# Patient Record
Sex: MALE | Race: WHITE | NOT HISPANIC OR LATINO | Employment: OTHER | URBAN - METROPOLITAN AREA
[De-identification: names, ages, dates, MRNs, and addresses within clinical notes are randomized per-mention and may not be internally consistent; named-entity substitution may affect disease eponyms.]

---

## 2017-12-19 ENCOUNTER — TELEPHONE (OUTPATIENT)
Dept: GASTROENTEROLOGY | Facility: CLINIC | Age: 67
End: 2017-12-19

## 2017-12-19 NOTE — TELEPHONE ENCOUNTER
----- Message from Dianne Bill sent at 12/19/2017  3:43 PM CST -----  Contact: self [phone]  States that he would like a new pt appt with Dr Jordan for cirrhosis. Please call back at [phone]//thank you acc

## 2018-01-05 ENCOUNTER — OFFICE VISIT (OUTPATIENT)
Dept: GASTROENTEROLOGY | Facility: CLINIC | Age: 68
End: 2018-01-05
Payer: MEDICARE

## 2018-01-05 ENCOUNTER — LAB VISIT (OUTPATIENT)
Dept: LAB | Facility: HOSPITAL | Age: 68
End: 2018-01-05
Attending: INTERNAL MEDICINE
Payer: MEDICARE

## 2018-01-05 VITALS
WEIGHT: 238.31 LBS | BODY MASS INDEX: 32.28 KG/M2 | SYSTOLIC BLOOD PRESSURE: 128 MMHG | HEART RATE: 60 BPM | HEIGHT: 72 IN | DIASTOLIC BLOOD PRESSURE: 80 MMHG

## 2018-01-05 DIAGNOSIS — K70.30 ALCOHOLIC CIRRHOSIS OF LIVER WITHOUT ASCITES: Primary | ICD-10-CM

## 2018-01-05 DIAGNOSIS — K70.30 ALCOHOLIC CIRRHOSIS OF LIVER WITHOUT ASCITES: ICD-10-CM

## 2018-01-05 DIAGNOSIS — E66.9 OBESITY (BMI 30.0-34.9): ICD-10-CM

## 2018-01-05 DIAGNOSIS — Z80.42 FAMILY HX OF PROSTATE CANCER: ICD-10-CM

## 2018-01-05 DIAGNOSIS — F10.10 ALCOHOL ABUSE: ICD-10-CM

## 2018-01-05 DIAGNOSIS — E78.2 MIXED HYPERLIPIDEMIA: ICD-10-CM

## 2018-01-05 DIAGNOSIS — I10 ESSENTIAL HYPERTENSION: ICD-10-CM

## 2018-01-05 PROBLEM — R73.03 PREDIABETES: Status: ACTIVE | Noted: 2017-09-07

## 2018-01-05 LAB
ALBUMIN SERPL BCP-MCNC: 3.7 G/DL
ALP SERPL-CCNC: 112 U/L
ALT SERPL W/O P-5'-P-CCNC: 33 U/L
ANION GAP SERPL CALC-SCNC: 9 MMOL/L
AST SERPL-CCNC: 25 U/L
BASOPHILS # BLD AUTO: 0.05 K/UL
BASOPHILS NFR BLD: 0.8 %
BILIRUB DIRECT SERPL-MCNC: 0.3 MG/DL
BILIRUB SERPL-MCNC: 0.8 MG/DL
BUN SERPL-MCNC: 15 MG/DL
CALCIUM SERPL-MCNC: 9.6 MG/DL
CHLORIDE SERPL-SCNC: 105 MMOL/L
CO2 SERPL-SCNC: 27 MMOL/L
CREAT SERPL-MCNC: 0.9 MG/DL
DIFFERENTIAL METHOD: ABNORMAL
EOSINOPHIL # BLD AUTO: 0.1 K/UL
EOSINOPHIL NFR BLD: 2.1 %
ERYTHROCYTE [DISTWIDTH] IN BLOOD BY AUTOMATED COUNT: 12.4 %
EST. GFR  (AFRICAN AMERICAN): >60 ML/MIN/1.73 M^2
EST. GFR  (NON AFRICAN AMERICAN): >60 ML/MIN/1.73 M^2
GLUCOSE SERPL-MCNC: 108 MG/DL
HCT VFR BLD AUTO: 42.9 %
HGB BLD-MCNC: 14.6 G/DL
IMM GRANULOCYTES # BLD AUTO: 0.02 K/UL
IMM GRANULOCYTES NFR BLD AUTO: 0.3 %
INR PPP: 1
LYMPHOCYTES # BLD AUTO: 1.9 K/UL
LYMPHOCYTES NFR BLD: 30.1 %
MCH RBC QN AUTO: 33.3 PG
MCHC RBC AUTO-ENTMCNC: 34 G/DL
MCV RBC AUTO: 98 FL
MONOCYTES # BLD AUTO: 0.6 K/UL
MONOCYTES NFR BLD: 9.1 %
NEUTROPHILS # BLD AUTO: 3.7 K/UL
NEUTROPHILS NFR BLD: 57.6 %
NRBC BLD-RTO: 0 /100 WBC
PLATELET # BLD AUTO: 178 K/UL
PMV BLD AUTO: 11.7 FL
POTASSIUM SERPL-SCNC: 4.3 MMOL/L
PROT SERPL-MCNC: 7.4 G/DL
PROTHROMBIN TIME: 10.8 SEC
RBC # BLD AUTO: 4.39 M/UL
SODIUM SERPL-SCNC: 141 MMOL/L
WBC # BLD AUTO: 6.34 K/UL

## 2018-01-05 PROCEDURE — 82247 BILIRUBIN TOTAL: CPT

## 2018-01-05 PROCEDURE — 36415 COLL VENOUS BLD VENIPUNCTURE: CPT

## 2018-01-05 PROCEDURE — 85610 PROTHROMBIN TIME: CPT

## 2018-01-05 PROCEDURE — 99204 OFFICE O/P NEW MOD 45 MIN: CPT | Mod: S$GLB,,, | Performed by: INTERNAL MEDICINE

## 2018-01-05 PROCEDURE — 99999 PR PBB SHADOW E&M-EST. PATIENT-LVL III: CPT | Mod: PBBFAC,,, | Performed by: INTERNAL MEDICINE

## 2018-01-05 PROCEDURE — 80048 BASIC METABOLIC PNL TOTAL CA: CPT

## 2018-01-05 PROCEDURE — 85025 COMPLETE CBC W/AUTO DIFF WBC: CPT

## 2018-01-05 PROCEDURE — 80076 HEPATIC FUNCTION PANEL: CPT

## 2018-01-05 RX ORDER — PHENYLEPHRINE HCL 10 MG
1000 TABLET ORAL
COMMUNITY
End: 2023-06-27

## 2018-01-05 RX ORDER — METFORMIN HYDROCHLORIDE 500 MG/1
500 TABLET, EXTENDED RELEASE ORAL
COMMUNITY
Start: 2017-12-14 | End: 2024-03-11

## 2018-01-05 RX ORDER — CARVEDILOL 6.25 MG/1
TABLET ORAL
COMMUNITY
Start: 2017-09-11

## 2018-01-05 RX ORDER — ATORVASTATIN CALCIUM 40 MG/1
20 TABLET, FILM COATED ORAL
COMMUNITY
Start: 2017-12-14

## 2018-01-05 RX ORDER — AMOXICILLIN 500 MG
2 CAPSULE ORAL
COMMUNITY

## 2018-01-05 RX ORDER — AMLODIPINE BESYLATE 5 MG/1
5 TABLET ORAL
COMMUNITY
Start: 2017-12-14

## 2018-01-05 RX ORDER — SILDENAFIL 100 MG/1
100 TABLET, FILM COATED ORAL
Status: ON HOLD | COMMUNITY
Start: 2017-01-13 | End: 2021-03-25 | Stop reason: HOSPADM

## 2018-01-05 RX ORDER — OMEPRAZOLE 40 MG/1
40 CAPSULE, DELAYED RELEASE ORAL
Status: ON HOLD | COMMUNITY
Start: 2017-12-14 | End: 2023-09-26 | Stop reason: SDUPTHER

## 2018-01-05 RX ORDER — IRBESARTAN 300 MG/1
TABLET ORAL
COMMUNITY
Start: 2017-11-29

## 2018-01-05 RX ORDER — ASPIRIN 81 MG/1
81 TABLET ORAL
COMMUNITY
End: 2023-06-27

## 2018-01-05 RX ORDER — MULTIVITAMIN
1 TABLET ORAL
COMMUNITY

## 2018-01-05 RX ORDER — DOCUSATE SODIUM 100 MG/1
100 CAPSULE, LIQUID FILLED ORAL
COMMUNITY

## 2018-01-05 NOTE — PROGRESS NOTES
Subjective:       Patient ID: Terence Marin is a 67 y.o. male.    Chief Complaint: Cirrhosis    The patient has a history of abnormal LFTs and has been suggested of having Cirrhosis due to Alcohol. He is here for a 2nd opinion. We have sent for records from the patient's previous MD and they are pending. I do have the results of an abdominal U/S done in April of 2016 which showed his liver to be unremarkable and he had no splenomegaly and no Ascites. He has, however, been drinking heavily in the recent pass until he stopped in November.     He as never been jaundiced. He has never been diagnosed with hepatitis, but he has been transfused before after his splenic rupture in ~ 2004. He has never used IV drugs and never had a tatoo. He was told recently that he has evidence of alcoholic cirrhosis, but has not had imaging studies done. I assume this could be from fibrosure evaluation. He was also told he did not have evidence of hepatitis. He has never been told of having Fatty Liver, but he recently has been shown to have what sounds like a slightly elevated elevated Hgb A1C. Discussed the impact this could have in a patient wtth HTN and hyperlipidemia and the added risk of Steato-hepatitis. Some of these things had to be considered from his medication list and some limited history from the patient and his wife.      Review of Systems   Constitutional: Negative.  Negative for activity change, appetite change, chills, diaphoresis, fatigue, fever and unexpected weight change.   HENT: Negative for congestion, ear discharge, facial swelling, hearing loss, nosebleeds, postnasal drip, sinus pressure, sneezing, tinnitus, trouble swallowing and voice change.    Eyes: Negative for photophobia, redness and visual disturbance.   Respiratory: Negative for cough, chest tightness, shortness of breath and wheezing.    Cardiovascular: Negative for chest pain and palpitations.   Gastrointestinal: Positive for abdominal distention.  Negative for abdominal pain, blood in stool, constipation, diarrhea, nausea, rectal pain and vomiting.   Genitourinary: Negative for difficulty urinating, discharge, dysuria, flank pain, frequency, hematuria, scrotal swelling, testicular pain and urgency.   Musculoskeletal: Positive for arthralgias. Negative for back pain, gait problem, joint swelling, myalgias and neck stiffness.        Joint Stiffness   Skin: Negative for color change, pallor, rash and wound.   Neurological: Negative for dizziness, tremors, seizures, syncope, facial asymmetry, speech difficulty, weakness, light-headedness, numbness and headaches.   Hematological: Negative for adenopathy. Does not bruise/bleed easily.   Psychiatric/Behavioral: Negative for agitation, confusion, hallucinations, sleep disturbance and suicidal ideas.       Objective:      Physical Exam   Constitutional: He is oriented to person, place, and time. He appears well-developed and well-nourished. No distress.   HENT:   Head: Normocephalic and atraumatic.   Nose: Nose normal.   Mouth/Throat: Oropharynx is clear and moist. No oropharyngeal exudate.   Eyes: Conjunctivae are normal. Pupils are equal, round, and reactive to light. No scleral icterus.   Neck: Normal range of motion. Neck supple. No thyromegaly present.   Cardiovascular: Normal rate and regular rhythm.  Exam reveals no gallop and no friction rub.    No murmur heard.  Pulmonary/Chest: Effort normal and breath sounds normal. No respiratory distress. He has no wheezes. He has no rales.   Abdominal: Soft. Bowel sounds are normal. He exhibits no distension and no mass. There is no tenderness. There is no rebound and no guarding.   Musculoskeletal: He exhibits no edema or tenderness.   Lymphadenopathy:     He has no cervical adenopathy.   Neurological: He is alert and oriented to person, place, and time. He exhibits normal muscle tone. Coordination normal.   Skin: Skin is warm. No rash noted. He is not diaphoretic.    Psychiatric: He has a normal mood and affect. His behavior is normal. Judgment and thought content normal.       Assessment:   Abnormal liver function studies  Alcohol abuse  Suspicion of cirrhosis        Records from referring physician in further studies needed  Obesity   ? Early type 2 diabetes   HTN  Hyperlipidemia  Hx of Colon Polyps  Hx of Prostate Cancer  No diagnosis found.    Plan:   As outlined above.  Further recommendations will follow

## 2018-01-08 ENCOUNTER — TELEPHONE (OUTPATIENT)
Dept: GASTROENTEROLOGY | Facility: CLINIC | Age: 68
End: 2018-01-08

## 2018-01-08 NOTE — TELEPHONE ENCOUNTER
----- Message from Lanie Adams sent at 1/8/2018  8:36 AM CST -----  Contact: [facility]/Dr. Ivone [facility]  [facility]issa called and stated they received a request for records; they need a signed release from the patient to be faxed to them.     Fax number [phone].    She can be contacted at [phone].    Thanks,  Lanie

## 2018-01-10 ENCOUNTER — TELEPHONE (OUTPATIENT)
Dept: RADIOLOGY | Facility: HOSPITAL | Age: 68
End: 2018-01-10

## 2018-01-11 ENCOUNTER — HOSPITAL ENCOUNTER (OUTPATIENT)
Dept: RADIOLOGY | Facility: HOSPITAL | Age: 68
Discharge: HOME | End: 2018-01-11
Attending: INTERNAL MEDICINE
Payer: MEDICARE

## 2018-01-11 DIAGNOSIS — K70.30 ALCOHOLIC CIRRHOSIS OF LIVER WITHOUT ASCITES: ICD-10-CM

## 2018-01-11 LAB
A2 MACROGLOB SERPL-MCNC: 284 MG/DL (ref 106–279)
ALT SERPL W P-5'-P-CCNC: 29 U/L (ref 9–46)
APO A-I SERPL-MCNC: 139 MG/DL (ref 94–176)
BILIRUB SERPL-MCNC: 0.6 MG/DL (ref 0.2–1.2)
FIBROSIS STAGE SERPL QL: ABNORMAL
FIBROTEST INTERPRETATION: ABNORMAL
FOOTNOTE: ABNORMAL
GGT SERPL-CCNC: 38 U/L (ref 3–70)
HAPTOGLOB SERPL-MCNC: 112 MG/DL (ref 43–212)
LIVER FIBR SCORE SERPL CALC.FIBROSURE: 0.59
NECROINFLAMMAT INTERP: ABNORMAL
NECROINFLAMMATORY ACT GRADE SERPL QL: ABNORMAL
NECROINFLAMMATORY ACT SCORE SERPL: 0.2
REFERENCE ID: ABNORMAL

## 2018-01-11 PROCEDURE — 76700 US EXAM ABDOM COMPLETE: CPT | Mod: 26,,, | Performed by: RADIOLOGY

## 2018-01-11 PROCEDURE — 76700 US EXAM ABDOM COMPLETE: CPT | Mod: TC

## 2018-01-15 ENCOUNTER — OFFICE VISIT (OUTPATIENT)
Dept: GASTROENTEROLOGY | Facility: CLINIC | Age: 68
End: 2018-01-15
Payer: MEDICARE

## 2018-01-15 VITALS
SYSTOLIC BLOOD PRESSURE: 130 MMHG | WEIGHT: 238.75 LBS | HEIGHT: 72 IN | HEART RATE: 74 BPM | BODY MASS INDEX: 32.34 KG/M2 | DIASTOLIC BLOOD PRESSURE: 82 MMHG

## 2018-01-15 DIAGNOSIS — K76.0 FATTY LIVER: ICD-10-CM

## 2018-01-15 DIAGNOSIS — K80.20 ASYMPTOMATIC CHOLELITHIASIS: ICD-10-CM

## 2018-01-15 DIAGNOSIS — E66.9 OBESITY (BMI 30-39.9): ICD-10-CM

## 2018-01-15 DIAGNOSIS — K70.9 ALCOHOLIC LIVER DISEASE: Primary | ICD-10-CM

## 2018-01-15 PROCEDURE — 99999 PR PBB SHADOW E&M-EST. PATIENT-LVL III: CPT | Mod: PBBFAC,,, | Performed by: INTERNAL MEDICINE

## 2018-01-15 PROCEDURE — 99213 OFFICE O/P EST LOW 20 MIN: CPT | Mod: S$GLB,,, | Performed by: INTERNAL MEDICINE

## 2018-01-29 NOTE — PROGRESS NOTES
Subjective:       Patient ID: Terence Marin is a 67 y.o. male.    Chief Complaint: Follow-up    The patient was last seen on January 7 regarding a second opinion on a diagnosis given of alcoholic cirrhosis.  The details of that visit are well outlined in the record from that date and reviewed in detail with the patient's visit today.  He is here today to review the results of ordered studies done since his last visit.    Abdominal ultrasound reveals the presence of borderline hepatomegaly with increased echogenicity suggestive of fatty infiltration of the liver.  Though this is true, the echogenicity could be suggestive of the effects of chronic liver disease associated with his alcohol history is also still could be suggestive of the presence of fat.  There was, however, no splenomegaly, no nodularity, no varices, and no splenomegaly mentioned.  The patient was noted to have cholelithiasis with sludge but no evidence of acute cholecystitis and there was no tenderness with passage of the echoprobe over the examined gallbladder area.  The patient's labs show no evidence BMP, LFTs, and PT/INR to be within normal range.  His CBC was also unremarkable.    The patient's fiber sure study supported the observation of the ultrasound with findings suggestive of stage III fibrosis but not cirrhosis.  In fact the patient's liver function test showed improvement in his transaminases levels from his November study until now, consistent with his claim of abstinence since that time.  His AST was 49 in November, and is now down to 25.        Review of Systems   Constitutional: Negative.  Negative for activity change, appetite change, chills, diaphoresis, fatigue, fever and unexpected weight change.   HENT: Negative for congestion, ear discharge, facial swelling, hearing loss, nosebleeds, postnasal drip, sinus pressure, sneezing, tinnitus, trouble swallowing and voice change.    Eyes: Negative for photophobia, redness and visual  disturbance.   Respiratory: Negative for cough, chest tightness, shortness of breath and wheezing.    Cardiovascular: Negative for chest pain and palpitations.   Gastrointestinal: Negative for abdominal distention, abdominal pain, blood in stool, constipation, diarrhea, nausea, rectal pain and vomiting.   Genitourinary: Negative for difficulty urinating, discharge, dysuria, flank pain, frequency, hematuria, scrotal swelling, testicular pain and urgency.   Musculoskeletal: Negative for arthralgias, back pain, gait problem, joint swelling, myalgias and neck stiffness.   Skin: Negative for color change, pallor, rash and wound.   Neurological: Negative for dizziness, tremors, seizures, syncope, facial asymmetry, speech difficulty, weakness, light-headedness, numbness and headaches.   Hematological: Negative for adenopathy. Does not bruise/bleed easily.   Psychiatric/Behavioral: Negative for agitation, confusion, hallucinations, sleep disturbance and suicidal ideas.       Objective:      Physical Exam    Assessment:     Alcoholic liver disease               No evidence of presentation suggest cirrhosis              He does understand that stage III fibrosis is suggestive of significant liver damage   Asymptomatic cholelithiasis   No diagnosis found.    Plan:   It was again emphasized that ongoing alcohol consumption presents a significant risk of alcoholic liver disease.  Also reminded him that he has risk factors for fatty liver disease which could lead to steatohepatitis.  Weight reduction again was discussed in its major impact on fatty liver not related to alcohol, none with lipids and possible insulin resistance and weight issues in the picture THINGS have to be weighed on the polyps.  The patient insists that abstinence is in his future and he will be working on weight reduction issues as well.There is no need to address cholelithiasis as the patient is asymptomatic.

## 2018-06-04 ENCOUNTER — TELEPHONE (OUTPATIENT)
Dept: GASTROENTEROLOGY | Facility: CLINIC | Age: 68
End: 2018-06-04

## 2018-06-15 ENCOUNTER — LAB VISIT (OUTPATIENT)
Dept: LAB | Facility: HOSPITAL | Age: 68
End: 2018-06-15
Attending: NURSE PRACTITIONER
Payer: MEDICARE

## 2018-06-15 ENCOUNTER — OFFICE VISIT (OUTPATIENT)
Dept: GASTROENTEROLOGY | Facility: CLINIC | Age: 68
End: 2018-06-15
Payer: MEDICARE

## 2018-06-15 VITALS
SYSTOLIC BLOOD PRESSURE: 116 MMHG | HEIGHT: 72 IN | WEIGHT: 228.19 LBS | DIASTOLIC BLOOD PRESSURE: 80 MMHG | BODY MASS INDEX: 30.91 KG/M2 | HEART RATE: 84 BPM

## 2018-06-15 DIAGNOSIS — K76.0 FATTY LIVER: ICD-10-CM

## 2018-06-15 DIAGNOSIS — K76.9 CHRONIC LIVER DISEASE: Primary | ICD-10-CM

## 2018-06-15 DIAGNOSIS — K76.9 CHRONIC LIVER DISEASE: ICD-10-CM

## 2018-06-15 LAB
ALBUMIN SERPL BCP-MCNC: 4 G/DL
ALP SERPL-CCNC: 103 U/L
ALT SERPL W/O P-5'-P-CCNC: 24 U/L
ANION GAP SERPL CALC-SCNC: 6 MMOL/L
AST SERPL-CCNC: 22 U/L
BASOPHILS # BLD AUTO: 0.03 K/UL
BASOPHILS NFR BLD: 0.5 %
BILIRUB SERPL-MCNC: 0.8 MG/DL
BUN SERPL-MCNC: 19 MG/DL
CALCIUM SERPL-MCNC: 10.1 MG/DL
CHLORIDE SERPL-SCNC: 106 MMOL/L
CO2 SERPL-SCNC: 28 MMOL/L
CREAT SERPL-MCNC: 1.3 MG/DL
DIFFERENTIAL METHOD: ABNORMAL
EOSINOPHIL # BLD AUTO: 0.2 K/UL
EOSINOPHIL NFR BLD: 2.5 %
ERYTHROCYTE [DISTWIDTH] IN BLOOD BY AUTOMATED COUNT: 13.2 %
EST. GFR  (AFRICAN AMERICAN): >60 ML/MIN/1.73 M^2
EST. GFR  (NON AFRICAN AMERICAN): 56.1 ML/MIN/1.73 M^2
GLUCOSE SERPL-MCNC: 88 MG/DL
HCT VFR BLD AUTO: 41.5 %
HGB BLD-MCNC: 14 G/DL
IMM GRANULOCYTES # BLD AUTO: 0.02 K/UL
IMM GRANULOCYTES NFR BLD AUTO: 0.3 %
INR PPP: 1
LYMPHOCYTES # BLD AUTO: 1.7 K/UL
LYMPHOCYTES NFR BLD: 28.6 %
MCH RBC QN AUTO: 32.9 PG
MCHC RBC AUTO-ENTMCNC: 33.7 G/DL
MCV RBC AUTO: 98 FL
MONOCYTES # BLD AUTO: 0.6 K/UL
MONOCYTES NFR BLD: 9.2 %
NEUTROPHILS # BLD AUTO: 3.5 K/UL
NEUTROPHILS NFR BLD: 58.9 %
NRBC BLD-RTO: 0 /100 WBC
PLATELET # BLD AUTO: 204 K/UL
PMV BLD AUTO: 11.8 FL
POTASSIUM SERPL-SCNC: 5 MMOL/L
PROT SERPL-MCNC: 7.4 G/DL
PROTHROMBIN TIME: 10.6 SEC
RBC # BLD AUTO: 4.25 M/UL
SODIUM SERPL-SCNC: 140 MMOL/L
WBC # BLD AUTO: 5.98 K/UL

## 2018-06-15 PROCEDURE — 36415 COLL VENOUS BLD VENIPUNCTURE: CPT

## 2018-06-15 PROCEDURE — 85610 PROTHROMBIN TIME: CPT

## 2018-06-15 PROCEDURE — 99999 PR PBB SHADOW E&M-EST. PATIENT-LVL III: CPT | Mod: PBBFAC,,, | Performed by: NURSE PRACTITIONER

## 2018-06-15 PROCEDURE — 80053 COMPREHEN METABOLIC PANEL: CPT

## 2018-06-15 PROCEDURE — 85025 COMPLETE CBC W/AUTO DIFF WBC: CPT

## 2018-06-15 PROCEDURE — 3079F DIAST BP 80-89 MM HG: CPT | Mod: CPTII,S$GLB,, | Performed by: NURSE PRACTITIONER

## 2018-06-15 PROCEDURE — 3074F SYST BP LT 130 MM HG: CPT | Mod: CPTII,S$GLB,, | Performed by: NURSE PRACTITIONER

## 2018-06-15 PROCEDURE — 99214 OFFICE O/P EST MOD 30 MIN: CPT | Mod: S$GLB,,, | Performed by: NURSE PRACTITIONER

## 2018-06-15 RX ORDER — SILDENAFIL CITRATE 20 MG/1
TABLET ORAL
Status: ON HOLD | COMMUNITY
Start: 2018-02-13 | End: 2021-03-25 | Stop reason: HOSPADM

## 2018-06-15 NOTE — PROGRESS NOTES
Clinic Follow Up:  [facility] Follow Up Note    Reason for Follow Up:  The primary encounter diagnosis was Chronic liver disease. A diagnosis of Fatty liver was also pertinent to this visit.    PCP: To Obtain Unable       HPI:  This is a 67 y.o. male here for follow up of the above. He has seen Dr. Solomon previously but is new to me. He has chronic liver disease. This is likely secondary to his past alcohol abuse and MÉNDEZ. He has not had any alcohol since November 2017. He admits to drinking non-alcoholic beer. He is working with his primary care on good control over cholesterol and DM. He has started to exercise and has been intentionally losing weight. He denies any GI complaints at this time. No abdominal pain, hematochezia, melena, nausea, vomiting, or weight loss.  No evidence of liver decompensation: no ascites, confusion, or GI bleeding.    Review of Systems   Constitutional: Negative for activity change and appetite change.        As per interval history above   Respiratory: Negative for cough and shortness of breath.    Cardiovascular: Negative for chest pain.   Gastrointestinal: Negative for abdominal distention, abdominal pain, anal bleeding, blood in stool, constipation, diarrhea, nausea, rectal pain and vomiting.   Skin: Negative for color change and rash.       Medical History:  Past Medical History:   Diagnosis Date    Cancer     prostate    Diabetes mellitus     Hyperlipidemia     Hypertension     Ruptured spleen        Surgical History:   Past Surgical History:   Procedure Laterality Date    PROSTATECTOMY      TONSILLECTOMY         Family History:   Family History   Problem Relation Age of Onset    Lymphoma Mother     Dementia Father     Prostate cancer Father     Heart disease Father     Prostate cancer Brother        Social History:   Social History   Substance Use Topics    Smoking status: Former Smoker     Packs/day: 1.50     Years: 10.00     Types: Cigarettes      Quit date: 1/5/1979    Smokeless tobacco: Never Used    Alcohol use 33.6 oz/week     42 Cans of beer, 14 Glasses of wine per week      Comment: quit november 2nd       Allergies: Review of patient's allergies indicates:  No Known Allergies    Home Medications:  Current Outpatient Prescriptions on File Prior to Visit   Medication Sig Dispense Refill    amLODIPine (NORVASC) 5 MG tablet Take 5 mg by mouth.      aspirin (ECOTRIN) 81 MG EC tablet Take 81 mg by mouth.      atorvastatin (LIPITOR) 40 MG tablet Take 20 mg by mouth.      carvedilol (COREG) 6.25 MG tablet       cinnamon bark 500 mg capsule Take 1,000 mg by mouth.      docusate sodium (COLACE) 100 MG capsule Take 100 mg by mouth.      fish oil-omega-3 fatty acids 300-1,000 mg capsule Take 2 g by mouth.      irbesartan (AVAPRO) 300 MG tablet TAKE 1 TABLET EVERY DAY. NEED APPOINTMENT.      metFORMIN (GLUCOPHAGE-XR) 500 MG 24 hr tablet Take 500 mg by mouth.      multivitamin (THERAGRAN) per tablet Take 1 tablet by mouth.      omeprazole (PRILOSEC) 40 MG capsule Take 40 mg by mouth.      sildenafil (VIAGRA) 100 MG tablet Take 100 mg by mouth.       No current facility-administered medications on file prior to visit.        /80   Pulse 84   Ht 6' (1.829 m)   Wt 103.5 kg (228 lb 2.8 oz)   BMI 30.95 kg/m²   Body mass index is 30.95 kg/m².  Physical Exam   Constitutional: He is oriented to person, place, and time and well-developed, well-nourished, and in no distress. No distress.   HENT:   Head: Normocephalic.   Eyes: Conjunctivae are normal. Pupils are equal, round, and reactive to light.   Cardiovascular: Normal rate, regular rhythm and normal heart sounds.    Pulmonary/Chest: Effort normal and breath sounds normal. No respiratory distress.   Abdominal: Soft. Bowel sounds are normal. He exhibits no distension. There is no tenderness.   Neurological: He is alert and oriented to person, place, and time. No cranial nerve deficit.   Skin:  Skin is warm and dry. No rash noted.   Psychiatric: Mood and affect normal.       Labs: Pertinent labs reviewed.  Endoscopy:    CRC Screening:     Assessment:  1. Chronic liver disease    2. Fatty liver        Recommendations:  - patient stable and doing well  - he has chronic liver disease but no evidence of cirrhosis. Would like to monitor him every 6 months  - he has been completely abstaining  from alcohol and is losing weight.  - encouraged to continue   - recheck labs and ultrasound  - follow up in 6 months.   -     US Abdomen Complete; Future; Expected date: 06/15/2018  -     CBC auto differential; Future; Expected date: 06/15/2018  -     Comprehensive metabolic panel; Future; Expected date: 06/15/2018  -     Protime-INR; Future; Expected date: 06/15/2018    Return to Clinic:  Follow-up in about 6 months (around 12/15/2018).    Thank you for the opportunity to participate in the care of this patient.  RORY Smith

## 2018-06-28 ENCOUNTER — TELEPHONE (OUTPATIENT)
Dept: RADIOLOGY | Facility: HOSPITAL | Age: 68
End: 2018-06-28

## 2018-06-29 ENCOUNTER — HOSPITAL ENCOUNTER (OUTPATIENT)
Dept: RADIOLOGY | Facility: HOSPITAL | Age: 68
Discharge: HOME | End: 2018-06-29
Attending: NURSE PRACTITIONER
Payer: MEDICARE

## 2018-06-29 DIAGNOSIS — K76.0 FATTY LIVER: ICD-10-CM

## 2018-06-29 DIAGNOSIS — K76.9 CHRONIC LIVER DISEASE: ICD-10-CM

## 2018-06-29 PROCEDURE — 76700 US EXAM ABDOM COMPLETE: CPT | Mod: 26,,, | Performed by: RADIOLOGY

## 2018-06-29 PROCEDURE — 76700 US EXAM ABDOM COMPLETE: CPT | Mod: TC

## 2018-07-06 ENCOUNTER — TELEPHONE (OUTPATIENT)
Dept: GASTROENTEROLOGY | Facility: CLINIC | Age: 68
End: 2018-07-06

## 2018-07-06 NOTE — TELEPHONE ENCOUNTER
----- Message from Rupesh Grier sent at 7/6/2018  1:21 PM CDT -----  Contact: pt   Pt calling to get lab results. [facility] return call.       ..[phone] (home)

## 2019-01-25 ENCOUNTER — TELEPHONE (OUTPATIENT)
Dept: GASTROENTEROLOGY | Facility: CLINIC | Age: 69
End: 2019-01-25

## 2019-01-30 ENCOUNTER — OFFICE VISIT (OUTPATIENT)
Dept: GASTROENTEROLOGY | Facility: CLINIC | Age: 69
End: 2019-01-30
Payer: MEDICARE

## 2019-01-30 VITALS
HEART RATE: 80 BPM | DIASTOLIC BLOOD PRESSURE: 76 MMHG | SYSTOLIC BLOOD PRESSURE: 130 MMHG | BODY MASS INDEX: 31.36 KG/M2 | WEIGHT: 231.25 LBS

## 2019-01-30 DIAGNOSIS — K74.00 LIVER FIBROSIS: ICD-10-CM

## 2019-01-30 DIAGNOSIS — K76.0 FATTY LIVER: Primary | ICD-10-CM

## 2019-01-30 PROCEDURE — 1101F PT FALLS ASSESS-DOCD LE1/YR: CPT | Mod: CPTII,S$GLB,, | Performed by: NURSE PRACTITIONER

## 2019-01-30 PROCEDURE — 3075F PR MOST RECENT SYSTOLIC BLOOD PRESS GE 130-139MM HG: ICD-10-PCS | Mod: CPTII,S$GLB,, | Performed by: NURSE PRACTITIONER

## 2019-01-30 PROCEDURE — 99999 PR PBB SHADOW E&M-EST. PATIENT-LVL III: ICD-10-PCS | Mod: PBBFAC,,, | Performed by: NURSE PRACTITIONER

## 2019-01-30 PROCEDURE — 3078F DIAST BP <80 MM HG: CPT | Mod: CPTII,S$GLB,, | Performed by: NURSE PRACTITIONER

## 2019-01-30 PROCEDURE — 3078F PR MOST RECENT DIASTOLIC BLOOD PRESSURE < 80 MM HG: ICD-10-PCS | Mod: CPTII,S$GLB,, | Performed by: NURSE PRACTITIONER

## 2019-01-30 PROCEDURE — 99214 PR OFFICE/OUTPT VISIT, EST, LEVL IV, 30-39 MIN: ICD-10-PCS | Mod: S$GLB,,, | Performed by: NURSE PRACTITIONER

## 2019-01-30 PROCEDURE — 99999 PR PBB SHADOW E&M-EST. PATIENT-LVL III: CPT | Mod: PBBFAC,,, | Performed by: NURSE PRACTITIONER

## 2019-01-30 PROCEDURE — 1101F PR PT FALLS ASSESS DOC 0-1 FALLS W/OUT INJ PAST YR: ICD-10-PCS | Mod: CPTII,S$GLB,, | Performed by: NURSE PRACTITIONER

## 2019-01-30 PROCEDURE — 99214 OFFICE O/P EST MOD 30 MIN: CPT | Mod: S$GLB,,, | Performed by: NURSE PRACTITIONER

## 2019-01-30 PROCEDURE — 3075F SYST BP GE 130 - 139MM HG: CPT | Mod: CPTII,S$GLB,, | Performed by: NURSE PRACTITIONER

## 2019-01-30 NOTE — PROGRESS NOTES
Clinic Follow Up:  [facility] Follow Up Note    Reason for Follow Up:  The primary encounter diagnosis was Fatty liver. A diagnosis of Liver fibrosis was also pertinent to this visit.    PCP: Endy Long       HPI:  This is a 68 y.o. male here for follow up of the above  Pt previously seen by Vania Shipman NP.  Today is my first visit with him.  He was previously diagnosed with liver fibrosis and fatty liver per GI at [facility].  At that time, his LFTs were elevated.  He reports heavy ETOH at that time.  He states that he has had no ETOH in over 1 year.    He has been monitoring his diet and has some weight loss.   He had recent labs done with PCP and per Care Everywhere, shows LFTs are WNL.   Denies any abdominal pain.  No nausea or vomiting.  No change in bowel pattern.  No melena or hematochezia. No weight loss.  No upper GI bleeding.  No ascites or BLE.  No overt confusion.      Review of Systems   Constitutional: Negative for chills, fever, malaise/fatigue and weight loss.   Respiratory: Negative for cough.    Cardiovascular: Negative for chest pain.   Gastrointestinal:        Per HPI   Musculoskeletal: Negative for myalgias.   Skin: Negative for itching and rash.   Neurological: Negative for headaches.   Psychiatric/Behavioral: The patient is not nervous/anxious.        Medical History:  Past Medical History:   Diagnosis Date    Cancer     prostate    Diabetes mellitus     Hyperlipidemia     Hypertension     Ruptured spleen        Surgical History:   Past Surgical History:   Procedure Laterality Date    PROSTATECTOMY      TONSILLECTOMY         Family History:   Family History   Problem Relation Age of Onset    Lymphoma Mother     Dementia Father     Prostate cancer Father     Heart disease Father     Prostate cancer Brother        Social History:   Social History     Tobacco Use    Smoking status: Former Smoker     Packs/day: 1.50     Years: 10.00     Pack years:  15.00     Types: Cigarettes     Last attempt to quit: 1979     Years since quittin.0    Smokeless tobacco: Never Used   Substance Use Topics    Alcohol use: Yes     Alcohol/week: 33.6 oz     Types: 42 Cans of beer, 14 Glasses of wine per week     Comment: quit     Drug use: No       Allergies: Reviewed    Home Medications:  Current Outpatient Medications on File Prior to Visit   Medication Sig Dispense Refill    amLODIPine (NORVASC) 5 MG tablet Take 5 mg by mouth.      aspirin (ECOTRIN) 81 MG EC tablet Take 81 mg by mouth.      atorvastatin (LIPITOR) 40 MG tablet Take 20 mg by mouth.      carvedilol (COREG) 6.25 MG tablet       cinnamon bark 500 mg capsule Take 1,000 mg by mouth.      docusate sodium (COLACE) 100 MG capsule Take 100 mg by mouth.      fish oil-omega-3 fatty acids 300-1,000 mg capsule Take 2 g by mouth.      irbesartan (AVAPRO) 300 MG tablet TAKE 1 TABLET EVERY DAY. NEED APPOINTMENT.      metFORMIN (GLUCOPHAGE-XR) 500 MG 24 hr tablet Take 500 mg by mouth.      multivitamin (THERAGRAN) per tablet Take 1 tablet by mouth.      omeprazole (PRILOSEC) 40 MG capsule Take 40 mg by mouth.      sildenafil (REVATIO) 20 mg Tab Take 5 po 45 minutes before intercourse.      sildenafil (VIAGRA) 100 MG tablet Take 100 mg by mouth.       No current facility-administered medications on file prior to visit.        Physical Exam:  Vital Signs:  /76   Pulse 80   Wt 104.9 kg (231 lb 4.2 oz)   BMI 31.36 kg/m²   Body mass index is 31.36 kg/m².  Physical Exam   Constitutional: He is oriented to person, place, and time. He appears well-developed.   HENT:   Head: Normocephalic.   Eyes: No scleral icterus.   Neck: Normal range of motion.   Cardiovascular: Normal rate and regular rhythm.   Pulmonary/Chest: Effort normal and breath sounds normal.   Abdominal: Soft. Bowel sounds are normal. He exhibits no distension. There is no tenderness.   Musculoskeletal: Normal range of motion.    Neurological: He is alert and oriented to person, place, and time.   Skin: Skin is warm and dry.   Psychiatric: He has a normal mood and affect.   Vitals reviewed.      Labs: Pertinent labs reviewed.    Assessment:  1. Fatty liver    2. Liver fibrosis        Recommendations:  Labs are stable  - Will plan for US and Fibroscan for staging.   - If advanced fibrosis or cirrhosis is found, will need additional management including EGD for EV screening and AFP.     Fatty liver  -     US Abdomen Complete; Future; Expected date: 01/30/2019  -     US Elastography Liver; Future; Expected date: 01/30/2019    Liver fibrosis  -     US Abdomen Complete; Future; Expected date: 01/30/2019  -     US Elastography Liver; Future; Expected date: 01/30/2019          Return to Clinic:  6 months depending on results of above

## 2019-02-05 ENCOUNTER — TELEPHONE (OUTPATIENT)
Dept: RADIOLOGY | Facility: HOSPITAL | Age: 69
End: 2019-02-05

## 2019-02-06 ENCOUNTER — HOSPITAL ENCOUNTER (OUTPATIENT)
Dept: RADIOLOGY | Facility: HOSPITAL | Age: 69
Discharge: HOME | End: 2019-02-06
Attending: NURSE PRACTITIONER
Payer: MEDICARE

## 2019-02-06 ENCOUNTER — PROCEDURE VISIT (OUTPATIENT)
Dept: GASTROENTEROLOGY | Facility: CLINIC | Age: 69
End: 2019-02-06
Attending: NURSE PRACTITIONER
Payer: MEDICARE

## 2019-02-06 VITALS
HEIGHT: 72 IN | BODY MASS INDEX: 30.99 KG/M2 | DIASTOLIC BLOOD PRESSURE: 76 MMHG | SYSTOLIC BLOOD PRESSURE: 132 MMHG | HEART RATE: 60 BPM | WEIGHT: 228.81 LBS

## 2019-02-06 DIAGNOSIS — K76.0 FATTY LIVER: ICD-10-CM

## 2019-02-06 DIAGNOSIS — K74.00 LIVER FIBROSIS: ICD-10-CM

## 2019-02-06 PROCEDURE — 76700 US ABDOMEN COMPLETE: ICD-10-PCS | Mod: 26,,, | Performed by: RADIOLOGY

## 2019-02-06 PROCEDURE — 91200 PR LIVER ELASTOGRAPHY W/OUT IMAG W/INTERP & REPORT: ICD-10-PCS | Mod: S$GLB,,, | Performed by: NURSE PRACTITIONER

## 2019-02-06 PROCEDURE — 91200 LIVER ELASTOGRAPHY: CPT | Mod: S$GLB,,, | Performed by: NURSE PRACTITIONER

## 2019-02-06 PROCEDURE — 76700 US EXAM ABDOM COMPLETE: CPT | Mod: 26,,, | Performed by: RADIOLOGY

## 2019-02-06 PROCEDURE — 76700 US EXAM ABDOM COMPLETE: CPT | Mod: TC

## 2019-02-08 NOTE — PROCEDURES
Fibroscan Procedure     Name: Terence Marin  Date of Procedure : 02/08/2019   :: ERNESTO Holloway  Diagnosis: NAFLD      Fibrosis:F4       Steatosis: :S3       Interpretation: scan shows cirrhosis of the liver with significant steatosis.  Needs F/U every 6 months for MELD labs and HCC surveillance.

## 2019-02-13 ENCOUNTER — TELEPHONE (OUTPATIENT)
Dept: GASTROENTEROLOGY | Facility: CLINIC | Age: 69
End: 2019-02-13

## 2019-02-13 NOTE — TELEPHONE ENCOUNTER
Called and spoke with patient about results of fibroscan and US. Pt verbalized understanding. Appt scheduled. LIZETH.

## 2019-02-15 ENCOUNTER — OFFICE VISIT (OUTPATIENT)
Dept: GASTROENTEROLOGY | Facility: CLINIC | Age: 69
End: 2019-02-15
Payer: MEDICARE

## 2019-02-15 VITALS
BODY MASS INDEX: 31.26 KG/M2 | DIASTOLIC BLOOD PRESSURE: 80 MMHG | HEIGHT: 72 IN | SYSTOLIC BLOOD PRESSURE: 150 MMHG | HEART RATE: 80 BPM | WEIGHT: 230.81 LBS

## 2019-02-15 DIAGNOSIS — K70.30 ALCOHOLIC CIRRHOSIS OF LIVER WITHOUT ASCITES: Primary | ICD-10-CM

## 2019-02-15 DIAGNOSIS — K76.0 FATTY LIVER: ICD-10-CM

## 2019-02-15 PROCEDURE — 3079F DIAST BP 80-89 MM HG: CPT | Mod: CPTII,S$GLB,, | Performed by: NURSE PRACTITIONER

## 2019-02-15 PROCEDURE — 3079F PR MOST RECENT DIASTOLIC BLOOD PRESSURE 80-89 MM HG: ICD-10-PCS | Mod: CPTII,S$GLB,, | Performed by: NURSE PRACTITIONER

## 2019-02-15 PROCEDURE — 1101F PT FALLS ASSESS-DOCD LE1/YR: CPT | Mod: CPTII,S$GLB,, | Performed by: NURSE PRACTITIONER

## 2019-02-15 PROCEDURE — 99214 OFFICE O/P EST MOD 30 MIN: CPT | Mod: S$GLB,,, | Performed by: NURSE PRACTITIONER

## 2019-02-15 PROCEDURE — 3077F PR MOST RECENT SYSTOLIC BLOOD PRESSURE >= 140 MM HG: ICD-10-PCS | Mod: CPTII,S$GLB,, | Performed by: NURSE PRACTITIONER

## 2019-02-15 PROCEDURE — 1101F PR PT FALLS ASSESS DOC 0-1 FALLS W/OUT INJ PAST YR: ICD-10-PCS | Mod: CPTII,S$GLB,, | Performed by: NURSE PRACTITIONER

## 2019-02-15 PROCEDURE — 99214 PR OFFICE/OUTPT VISIT, EST, LEVL IV, 30-39 MIN: ICD-10-PCS | Mod: S$GLB,,, | Performed by: NURSE PRACTITIONER

## 2019-02-15 PROCEDURE — 99999 PR PBB SHADOW E&M-EST. PATIENT-LVL IV: ICD-10-PCS | Mod: PBBFAC,,, | Performed by: NURSE PRACTITIONER

## 2019-02-15 PROCEDURE — 99999 PR PBB SHADOW E&M-EST. PATIENT-LVL IV: CPT | Mod: PBBFAC,,, | Performed by: NURSE PRACTITIONER

## 2019-02-15 PROCEDURE — 3077F SYST BP >= 140 MM HG: CPT | Mod: CPTII,S$GLB,, | Performed by: NURSE PRACTITIONER

## 2019-02-15 NOTE — PROGRESS NOTES
Clinic Follow Up:  [facility] Follow Up Note    Reason for Follow Up:  The primary encounter diagnosis was Alcoholic cirrhosis of liver without ascites. A diagnosis of Fatty liver was also pertinent to this visit.    PCP: Endy Long       HPI:  This is a 68 y.o. male here for follow up of the above  Pt states that he is feeling well without any complaints  Recent fibroscan showed cirrhosis with a significant amount of steatosis.    Denies any abdominal pain.  No nausea or vomiting.  No change in bowel pattern.  No melena or hematochezia. No weight loss.  No upper GI bleeding.  No ascites or BLE.  No overt confusion.      Review of Systems   Constitutional: Negative for chills, fever, malaise/fatigue and weight loss.   Respiratory: Negative for cough.    Cardiovascular: Negative for chest pain.   Gastrointestinal:        Per HPI   Musculoskeletal: Negative for myalgias.   Skin: Negative for itching and rash.   Neurological: Negative for headaches.   Psychiatric/Behavioral: The patient is not nervous/anxious.        Medical History:  Past Medical History:   Diagnosis Date    Cancer     prostate    Diabetes mellitus     Hyperlipidemia     Hypertension     Ruptured spleen        Surgical History:   Past Surgical History:   Procedure Laterality Date    PROSTATECTOMY      TONSILLECTOMY         Family History:   Family History   Problem Relation Age of Onset    Lymphoma Mother     Dementia Father     Prostate cancer Father     Heart disease Father     Prostate cancer Brother        Social History:   Social History     Tobacco Use    Smoking status: Former Smoker     Packs/day: 1.50     Years: 10.00     Pack years: 15.00     Types: Cigarettes     Last attempt to quit: 1979     Years since quittin.1    Smokeless tobacco: Never Used   Substance Use Topics    Alcohol use: Yes     Alcohol/week: 33.6 oz     Types: 42 Cans of beer, 14 Glasses of wine per week     Comment: quit  november 2nd    Drug use: No       Allergies: Reviewed    Home Medications:  Current Outpatient Medications on File Prior to Visit   Medication Sig Dispense Refill    amLODIPine (NORVASC) 5 MG tablet Take 5 mg by mouth.      aspirin (ECOTRIN) 81 MG EC tablet Take 81 mg by mouth.      atorvastatin (LIPITOR) 40 MG tablet Take 20 mg by mouth.      carvedilol (COREG) 6.25 MG tablet       cinnamon bark 500 mg capsule Take 1,000 mg by mouth.      docusate sodium (COLACE) 100 MG capsule Take 100 mg by mouth.      fish oil-omega-3 fatty acids 300-1,000 mg capsule Take 2 g by mouth.      irbesartan (AVAPRO) 300 MG tablet TAKE 1 TABLET EVERY DAY. NEED APPOINTMENT.      metFORMIN (GLUCOPHAGE-XR) 500 MG 24 hr tablet Take 500 mg by mouth.      multivitamin (THERAGRAN) per tablet Take 1 tablet by mouth.      omeprazole (PRILOSEC) 40 MG capsule Take 40 mg by mouth.      sildenafil (REVATIO) 20 mg Tab Take 5 po 45 minutes before intercourse.      sildenafil (VIAGRA) 100 MG tablet Take 100 mg by mouth.       No current facility-administered medications on file prior to visit.        Physical Exam:  Vital Signs:  BP (!) 150/80   Pulse 80   Ht 6' (1.829 m)   Wt 104.7 kg (230 lb 13.2 oz)   BMI 31.31 kg/m²   Body mass index is 31.31 kg/m².  Physical Exam   Constitutional: He is oriented to person, place, and time. He appears well-developed.   HENT:   Head: Normocephalic.   Neck: Normal range of motion.   Cardiovascular: Normal rate and regular rhythm.   Pulmonary/Chest: Effort normal and breath sounds normal.   Abdominal: Soft. Bowel sounds are normal. He exhibits no distension. There is no tenderness.   Musculoskeletal: Normal range of motion.   Neurological: He is alert and oriented to person, place, and time.   Skin: Skin is warm and dry.   Psychiatric: He has a normal mood and affect.   Vitals reviewed.      Labs: Pertinent labs reviewed.  MELD-Na score: 9 at 6/15/2018  9:43 AM  MELD score: 9 at 6/15/2018  9:43  AM  Calculated from:  Serum Creatinine: 1.3 mg/dL at 6/15/2018  9:43 AM  Serum Sodium: 140 mmol/L (Rounded to 137 mmol/L) at 6/15/2018  9:43 AM  Total Bilirubin: 0.8 mg/dL (Rounded to 1 mg/dL) at 6/15/2018  9:43 AM  INR(ratio): 1 at 6/15/2018  9:43 AM  Age: 67 years\      Assessment:  1. Alcoholic cirrhosis of liver without ascites    2. Fatty liver        Recommendations:  - discussed diagnosis with potential complications and prognosis  - Pt states understanding  - Continue ETOH abstinence  - Encouraged weight loss through diet and exercise  - Needs EGD for EV screening  - Will plan for MELD labs and HCC surveillance every 6 months  Most recent labs on 1/22/19 with PCP per Care Everywhere  Alcoholic cirrhosis of liver without ascites  -     Case request GI: EGD (ESOPHAGOGASTRODUODENOSCOPY)  -     CBC auto differential; Future; Expected date: 02/15/2019  -     Comprehensive metabolic panel; Future; Expected date: 02/15/2019  -     Protime-INR; Future; Expected date: 02/15/2019  -     AFP tumor marker; Future; Expected date: 02/15/2019  -     US Abdomen Complete; Future; Expected date: 02/15/2019    Fatty liver  -     Case request GI: EGD (ESOPHAGOGASTRODUODENOSCOPY)  -     CBC auto differential; Future; Expected date: 02/15/2019  -     Comprehensive metabolic panel; Future; Expected date: 02/15/2019  -     Protime-INR; Future; Expected date: 02/15/2019  -     AFP tumor marker; Future; Expected date: 02/15/2019  -     US Abdomen Complete; Future; Expected date: 02/15/2019          Return to Clinic:    6 months with pre-clinic labs and imaging.

## 2019-02-25 NOTE — TELEPHONE ENCOUNTER
Handled in result notes. Patient verbalized understanding,   Sore Throat in Children: Care Instructions  Your Care Instructions  Infection by bacteria or a virus causes most sore throats. Cigarette smoke, dry air, air pollution, allergies, or yelling also can cause a sore throat. Sore throats can be painful and annoying. Fortunately, most sore throats go away on their own. Home treatment may help your child feel better sooner. Antibiotics are not needed unless your child has a strep infection. Follow-up care is a key part of your child's treatment and safety. Be sure to make and go to all appointments, and call your doctor if your child is having problems. It's also a good idea to know your child's test results and keep a list of the medicines your child takes. How can you care for your child at home? · If the doctor prescribed antibiotics for your child, give them as directed. Do not stop using them just because your child feels better. Your child needs to take the full course of antibiotics. · If your child is old enough to do so, have him or her gargle with warm salt water at least once each hour to help reduce swelling and relieve discomfort. Use 1 teaspoon of salt mixed in 8 ounces of warm water. Most children can gargle when they are 10to 6years old. · Give acetaminophen (Tylenol) or ibuprofen (Advil, Motrin) for pain. Read and follow all instructions on the label. Do not give aspirin to anyone younger than 20. It has been linked to Reye syndrome, a serious illness. · Try an over-the-counter anesthetic throat spray or throat lozenges, which may help relieve throat pain. Do not give lozenges to children younger than age 3. If your child is younger than age 3, ask your doctor if you can give your child numbing medicines. · Have your child drink plenty of fluids, enough so that his or her urine is light yellow or clear like water. Drinks such as warm water or warm lemonade may ease throat pain.  Frozen ice treats, ice cream, scrambled eggs, gelatin dessert, and sherbet can also soothe the throat. If your child has kidney, heart, or liver disease and has to limit fluids, talk with your doctor before you increase the amount of fluids your child drinks. · Keep your child away from smoke. Do not smoke or let anyone else smoke around your child or in your house. Smoke irritates the throat. · Place a humidifier by your child's bed or close to your child. This may make it easier for your child to breathe. Follow the directions for cleaning the machine. When should you call for help? Call 911 anytime you think your child may need emergency care. For example, call if:    · Your child is confused, does not know where he or she is, or is extremely sleepy or hard to wake up.    Call your doctor now or seek immediate medical care if:    · Your child has a new or higher fever.     · Your child has a fever with a stiff neck or a severe headache.     · Your child has any trouble breathing.     · Your child cannot swallow or cannot drink enough because of throat pain.     · Your child coughs up discolored or bloody mucus.    Watch closely for changes in your child's health, and be sure to contact your doctor if:    · Your child has any new symptoms, such as a rash, an earache, vomiting, or nausea.     · Your child is not getting better as expected. Where can you learn more? Go to http://colby-fritz.info/. Enter E175 in the search box to learn more about \"Sore Throat in Children: Care Instructions. \"  Current as of: March 27, 2018  Content Version: 11.9  © 6176-8957 Healthwise, Incorporated. Care instructions adapted under license by [employer] (which disclaims liability or warranty for this information). If you have questions about a medical condition or this instruction, always ask your healthcare professional. [place] [address] any warranty or liability for your use of this information.          Thrush in Children: Care Instructions  Your Care Instructions  Irene Bald is a yeast infection inside the mouth. It can look like milk, formula, or cottage cheese but is hard to remove. If you scrape the thrush away, the skin underneath may bleed. Your child might get thrush after using antibiotics. Often there is not a specific cause. It sometimes occurs at the same time as a diaper rash. Irene Bald in infants and young children isn't a serious problem. It usually goes away on its own. Some children may need antifungal medicine. Follow-up care is a key part of your child's treatment and safety. Be sure to make and go to all appointments, and call your doctor if your child is having problems. It's also a good idea to know your child's test results and keep a list of the medicines your child takes. How can you care for your child at home? · Clean bottle nipples and pacifiers regularly in boiling water. · If you are breastfeeding, use an antifungal medicine, such as nystatin (Mycostatin), on your nipples. Dry your nipples after breastfeeding. · If your child is eating solid foods, you can massage plain, unflavored yogurt around the inside of your child's mouth. Check the label to make sure that the yogurt contains live cultures. Yogurt may help healthy bacteria grow in the mouth. These bacteria can stop yeast growth. · Be safe with medicines. Have your child take medicines exactly as prescribed. Call your doctor if you think your child is having a problem with his or her medicine. When should you call for help? Watch closely for changes in your child's health, and be sure to contact your doctor if:    · Your child will not eat or drink.     · You have trouble giving or applying the medicine to your child.     · Your child still has thrush after 7 days.     · Your child gets a new diaper rash.     · Your child is not acting normally.     · Your child has a fever. Where can you learn more?   Go to http://colby-fritz.info/. Enter V150 in the search box to learn more about \"Thrush in Children: Care Instructions. \"  Current as of: March 27, 2018  Content Version: 11.9  © 6918-5530 [employer]orporated. Care instructions adapted under license by [employer] (which disclaims liability or warranty for this information). If you have questions about a medical condition or this instruction, always ask your healthcare professional. [address] any warranty or liability for your use of this information.

## 2019-03-18 ENCOUNTER — TELEPHONE (OUTPATIENT)
Dept: GASTROENTEROLOGY | Facility: CLINIC | Age: 69
End: 2019-03-18

## 2019-05-20 ENCOUNTER — ANESTHESIA (OUTPATIENT)
Dept: ENDOSCOPY | Facility: HOSPITAL | Age: 69
End: 2019-05-20
Payer: MEDICARE

## 2019-05-20 ENCOUNTER — HOSPITAL ENCOUNTER (OUTPATIENT)
Facility: HOSPITAL | Age: 69
Discharge: HOME | End: 2019-05-20
Attending: INTERNAL MEDICINE | Admitting: INTERNAL MEDICINE
Payer: MEDICARE

## 2019-05-20 ENCOUNTER — ANESTHESIA EVENT (OUTPATIENT)
Dept: ENDOSCOPY | Facility: HOSPITAL | Age: 69
End: 2019-05-20
Payer: MEDICARE

## 2019-05-20 DIAGNOSIS — I85.00 VARICES, ESOPHAGEAL: ICD-10-CM

## 2019-05-20 DIAGNOSIS — K70.30 ALCOHOLIC CIRRHOSIS OF LIVER WITHOUT ASCITES: Primary | ICD-10-CM

## 2019-05-20 PROCEDURE — 37000008 HC ANESTHESIA 1ST 15 MINUTES: Performed by: INTERNAL MEDICINE

## 2019-05-20 PROCEDURE — 88305 TISSUE EXAM BY PATHOLOGIST: CPT | Mod: 26,,, | Performed by: PATHOLOGY

## 2019-05-20 PROCEDURE — 37000009 HC ANESTHESIA EA ADD 15 MINS: Performed by: INTERNAL MEDICINE

## 2019-05-20 PROCEDURE — 43239 EGD BIOPSY SINGLE/MULTIPLE: CPT | Mod: ,,, | Performed by: INTERNAL MEDICINE

## 2019-05-20 PROCEDURE — 43239 EGD BIOPSY SINGLE/MULTIPLE: CPT | Performed by: INTERNAL MEDICINE

## 2019-05-20 PROCEDURE — 88305 TISSUE SPECIMEN TO PATHOLOGY: ICD-10-PCS | Mod: 26,,, | Performed by: PATHOLOGY

## 2019-05-20 PROCEDURE — 27201012 HC FORCEPS, HOT/COLD, DISP: Performed by: INTERNAL MEDICINE

## 2019-05-20 PROCEDURE — 25000003 PHARM REV CODE 250: Performed by: INTERNAL MEDICINE

## 2019-05-20 PROCEDURE — 63600175 PHARM REV CODE 636 W HCPCS: Performed by: NURSE ANESTHETIST, CERTIFIED REGISTERED

## 2019-05-20 PROCEDURE — 88305 TISSUE EXAM BY PATHOLOGIST: CPT | Performed by: PATHOLOGY

## 2019-05-20 PROCEDURE — 43239 PR EGD, FLEX, W/BIOPSY, SGL/MULTI: ICD-10-PCS | Mod: ,,, | Performed by: INTERNAL MEDICINE

## 2019-05-20 RX ORDER — SODIUM CHLORIDE, SODIUM LACTATE, POTASSIUM CHLORIDE, CALCIUM CHLORIDE 600; 310; 30; 20 MG/100ML; MG/100ML; MG/100ML; MG/100ML
INJECTION, SOLUTION INTRAVENOUS CONTINUOUS
Status: DISCONTINUED | OUTPATIENT
Start: 2019-05-20 | End: 2019-05-20 | Stop reason: HOSPADM

## 2019-05-20 RX ORDER — LIDOCAINE HCL/PF 100 MG/5ML
SYRINGE (ML) INTRAVENOUS
Status: DISCONTINUED | OUTPATIENT
Start: 2019-05-20 | End: 2019-05-20

## 2019-05-20 RX ORDER — PROPOFOL 10 MG/ML
INJECTION, EMULSION INTRAVENOUS
Status: DISCONTINUED | OUTPATIENT
Start: 2019-05-20 | End: 2019-05-20

## 2019-05-20 RX ORDER — SODIUM CHLORIDE 0.9 % (FLUSH) 0.9 %
10 SYRINGE (ML) INJECTION
Status: DISCONTINUED | OUTPATIENT
Start: 2019-05-20 | End: 2019-05-20 | Stop reason: HOSPADM

## 2019-05-20 RX ADMIN — PROPOFOL 140 MG: 10 INJECTION, EMULSION INTRAVENOUS at 07:05

## 2019-05-20 RX ADMIN — PROPOFOL 20 MG: 10 INJECTION, EMULSION INTRAVENOUS at 07:05

## 2019-05-20 RX ADMIN — SODIUM CHLORIDE, SODIUM LACTATE, POTASSIUM CHLORIDE, AND CALCIUM CHLORIDE: 600; 310; 30; 20 INJECTION, SOLUTION INTRAVENOUS at 07:05

## 2019-05-20 RX ADMIN — PROPOFOL 40 MG: 10 INJECTION, EMULSION INTRAVENOUS at 07:05

## 2019-05-20 RX ADMIN — LIDOCAINE HYDROCHLORIDE 100 MG: 20 INJECTION, SOLUTION INTRAVENOUS at 07:05

## 2019-05-20 NOTE — DISCHARGE INSTRUCTIONS
Dear Terence Marin      If you develop fevers, severe abdominal pain, significant bleeding, nausea or vomiting, please contact us or come to our Emergency Department at [facility].  Our  contact number is [phone] available for emergencies or any question that you may have.      You may return to normal activities tomorrow.  Written discharge instructions were provided to you today and have them handy since you may not remember our conversation today.       If you take Aspirin, please resume taking it at your prior dose today. If we obtained biopsies or removed polyps during your procedure, we will contact you within 5 to 6 days with the results.      Thanks for trusting us with your healthcare needs.      Sincerely,     Faheem Wilkins M.D.        To rate your experience with Dr. Wilkins, please click on the link below     [email or web address]

## 2019-05-20 NOTE — ANESTHESIA RELEASE NOTE
Anesthesia Release from PACU Note    Patient: Terence Marin    Procedure(s) Performed: Procedure(s) (LRB):  EGD (ESOPHAGOGASTRODUODENOSCOPY) (N/A)    Anesthesia type: MAC    Post pain: Adequate analgesia    Post assessment: no apparent anesthetic complications, tolerated procedure well and no evidence of recall    Last Vitals:   Visit Vitals  BP (!) 92/47   Pulse (!) 49   Temp 36.6 °C (97.9 °F)   Resp (!) 21   Ht 6' (1.829 m)   Wt 103.7 kg (228 lb 9.9 oz)   SpO2 95%   BMI 31.01 kg/m²       Post vital signs: stable    Level of consciousness: awake and alert     Nausea/Vomiting: no nausea/no vomiting    Complications: none    Airway Patency: patent    Respiratory: unassisted, spontaneous ventilation, room air    Cardiovascular: stable    Hydration: euvolemic

## 2019-05-20 NOTE — ANESTHESIA POSTPROCEDURE EVALUATION
Anesthesia Post Evaluation    Patient: Terence Marin    Procedure(s) Performed: Procedure(s) (LRB):  EGD (ESOPHAGOGASTRODUODENOSCOPY) (N/A)    Final Anesthesia Type: MAC  Patient location during evaluation: PACU  Patient participation: Yes- Able to Participate  Level of consciousness: awake and alert and oriented  Post-procedure vital signs: reviewed and stable  Pain management: adequate  Airway patency: patent  PONV status at discharge: No PONV  Anesthetic complications: no      Cardiovascular status: blood pressure returned to baseline  Respiratory status: unassisted, room air and spontaneous ventilation  Hydration status: euvolemic  Follow-up not needed.          Vitals Value Taken Time   BP 92/47 5/20/2019  8:08 AM   Temp 36.6 °C (97.9 °F) 5/20/2019  8:08 AM   Pulse 49 5/20/2019  8:08 AM   Resp 21 5/20/2019  8:08 AM   SpO2 95 % 5/20/2019  8:08 AM         No case tracking events are documented in the log.      Pain/Arnie Score: No data recorded

## 2019-05-20 NOTE — DISCHARGE SUMMARY
Endoscopy Discharge Summary      Admit Date: 5/20/2019    Discharge Date and Time:  5/20/2019 8:09 AM    Attending Physician: Faheem Wilkins MD     Discharge Physician: Faheem Wilkins MD     Principal Admitting Diagnoses: Alcoholic cirrhosis of liver without ascites         Discharge Diagnosis: The primary encounter diagnosis was Alcoholic cirrhosis of liver without ascites. A diagnosis of Varices, esophageal was also pertinent to this visit.     Discharged Condition: Good    Indication for Admission: Alcoholic cirrhosis of liver without ascites     Hospital Course: Patient was admitted for an inpatient procedure and tolerated the procedure well with no complications.    Significant Diagnostic Studies: EGD    Pathology (if any):  Specimen (12h ago, onward)    Start     Ordered    05/20/19 0758  Specimen to Pathology - Surgery  Once     Comments:  1.  Antrum biopsy -R/O H pylori,Mild gastritis     Start Status     05/20/19 0758 Needs to be Collected Order ID: [id number]       05/20/19 0801          Disposition: Home.    Bleeding: None    No Implants         Follow Up/Patient Instructions:   Current Discharge Medication List      CONTINUE these medications which have NOT CHANGED    Details   amLODIPine (NORVASC) 5 MG tablet Take 5 mg by mouth.      aspirin (ECOTRIN) 81 MG EC tablet Take 81 mg by mouth.      atorvastatin (LIPITOR) 40 MG tablet Take 20 mg by mouth.      carvedilol (COREG) 6.25 MG tablet       cinnamon bark 500 mg capsule Take 1,000 mg by mouth.      docusate sodium (COLACE) 100 MG capsule Take 100 mg by mouth.      fish oil-omega-3 fatty acids 300-1,000 mg capsule Take 2 g by mouth.      irbesartan (AVAPRO) 300 MG tablet TAKE 1 TABLET EVERY DAY. NEED APPOINTMENT.      multivitamin (THERAGRAN) per tablet Take 1 tablet by mouth.      omeprazole (PRILOSEC) 40 MG capsule Take 40 mg by mouth.      sildenafil (REVATIO) 20 mg Tab Take 5 po 45 minutes before intercourse.      sildenafil (VIAGRA) 100 MG tablet  Take 100 mg by mouth.      metFORMIN (GLUCOPHAGE-XR) 500 MG 24 hr tablet Take 500 mg by mouth.             Discharge Procedure Orders   Diet general     Call MD for:  temperature >100.4     Call MD for:  persistent nausea and vomiting     Call MD for:  severe uncontrolled pain     Call MD for:  difficulty breathing, headache or visual disturbances     Call MD for:  redness, tenderness, or signs of infection (pain, swelling, redness, odor or green/yellow discharge around incision site)     Call MD for:  hives     Call MD for:  persistent dizziness or light-headedness     No dressing needed       Follow-up Information     Endy Long MD.    Specialty:  Family Medicine  Why:  As needed  Contact information:  [facility] [facility]  [address]  [facility] [id number] [phone]

## 2019-05-20 NOTE — ANESTHESIA PREPROCEDURE EVALUATION
05/20/2019  Terence Marin is a 68 y.o., male.    Anesthesia Evaluation    I have reviewed the Patient Summary Reports.    I have reviewed the Nursing Notes.   I have reviewed the Medications.     Review of Systems  Anesthesia Hx:  No problems with previous Anesthesia    Social:  Former Smoker, No Alcohol Use    Hematology/Oncology:  Hematology Normal       -- Cancer in past history:  surgery  Oncology Comments: History of prostate cancer over 11 years ago.     EENT/Dental:   chronic allergic rhinitis   Cardiovascular:   Exercise tolerance: good Hypertension, well controlled hyperlipidemia    Hepatic/GI:   Liver Disease, Varices, esophageal   Musculoskeletal:  Musculoskeletal Normal    Neurological:  Neurology Normal    Endocrine:   Diabetes, well controlled, type 2        Physical Exam  General:  Well nourished, Obesity    Airway/Jaw/Neck:  Airway Findings: Mallampati: IV                Anesthesia Plan  Type of Anesthesia, risks & benefits discussed:  Anesthesia Type:  MAC  Patient's Preference:   Intra-op Monitoring Plan:   Intra-op Monitoring Plan Comments:   Post Op Pain Control Plan:   Post Op Pain Control Plan Comments:   Induction:   IV  Beta Blocker:  Patient is on a Beta-Blocker and has received one dose within the past 24 hours (No further documentation required).       Informed Consent: Patient understands risks and agrees with Anesthesia plan.  Questions answered. Anesthesia consent signed with patient.  ASA Score: 3     Day of Surgery Review of History & Physical: I have interviewed and examined the patient. I have reviewed the patient's H&P dated: 05/20/19. There are no significant changes.  H&P update referred to the surgeon.         Ready For Surgery From Anesthesia Perspective.

## 2019-05-20 NOTE — TRANSFER OF CARE
Anesthesia Transfer of Care Note    Patient: Terence Marin    Procedure(s) Performed: Procedure(s) (LRB):  EGD (ESOPHAGOGASTRODUODENOSCOPY) (N/A)    Patient location: PACU    Anesthesia Type: MAC    Transport from OR: Transported from OR on room air with adequate spontaneous ventilation    Post pain: adequate analgesia    Post assessment: no apparent anesthetic complications    Post vital signs: stable    Level of consciousness: sedated    Nausea/Vomiting: no nausea/vomiting    Complications: none    Transfer of care protocol was followed      Last vitals:   Visit Vitals  BP (!) 92/47   Pulse (!) 49   Temp 36.6 °C (97.9 °F)   Resp (!) 21   Ht 6' (1.829 m)   Wt 103.7 kg (228 lb 9.9 oz)   SpO2 95%   BMI 31.01 kg/m²

## 2019-05-20 NOTE — [employer] PATIENT INSTRUCTIONS
Discharge Summary/Instructions after an Endoscopic Procedure  Patient Name: Terence Marin  Patient MRN: [id number]  Patient YOB: 1950  Monday, May 20, 2019 Faheem Wilkins MD  RESTRICTIONS:  During your procedure today, you received medications for sedation.  These   medications may affect your judgment, balance and coordination.  Therefore,   for 24 hours, you have the following restrictions:   - DO NOT drive a car, operate machinery, make legal/financial decisions,   sign important papers or drink alcohol.    ACTIVITY:  Today: no heavy lifting, straining or running due to procedural   sedation/anesthesia.  The following day: return to full activity including work.  DIET:  Eat and drink normally unless instructed otherwise.     TREATMENT FOR COMMON SIDE EFFECTS:  - Mild abdominal pain, nausea, belching, bloating or excessive gas:  rest,   eat lightly and use a heating pad.  - Sore Throat: treat with throat lozenges and/or gargle with warm salt   water.  - Because air was used during the procedure, expelling large amounts of air   from your rectum or belching is normal.  - If a bowel prep was taken, you may not have a bowel movement for 1-3 days.    This is normal.  SYMPTOMS TO WATCH FOR AND REPORT TO YOUR PHYSICIAN:  1. Abdominal pain or bloating, other than gas cramps.  2. Chest pain.  3. Back pain.  4. Signs of infection such as: chills or fever occurring within 24 hours   after the procedure.  5. Rectal bleeding, which would show as bright red, maroon, or black stools.   (A tablespoon of blood from the rectum is not serious, especially if   hemorrhoids are present.)  6. Vomiting.  7. Weakness or dizziness.  GO DIRECTLY TO THE NEAREST EMERGENCY ROOM IF YOU HAVE ANY OF THE FOLLOWING:      Difficulty breathing              Chills and/or fever over 101 F   Persistent vomiting and/or vomiting blood   Severe abdominal pain   Severe chest pain   Black, tarry stools   Bleeding- more than one tablespoon   Any  other symptom or condition that you feel may need urgent attention  Your doctor recommends these additional instructions:  If any biopsies were taken, your doctors clinic will contact you in 1 to 2   weeks with any results.  - The patient will be observed post-procedure, until all discharge criteria   are met.   - Discharge patient to home (ambulatory).   - Patient has a contact number available for emergencies.  The signs and   symptoms of potential delayed complications were discussed with the   patient.  Return to normal activities tomorrow.  Written discharge   instructions were provided to the patient.   - Resume previous diet.   - Continue present medications.   - Await pathology results.   - Repeat upper endoscopy in 3 years for surveillance.   - Return to referring physician as previously scheduled.  For questions, problems or results please call your physician Faheem Wilkins MD at Work:  [phone]  If you have any questions about the above instructions, call the GI   department at [phone] or call the endoscopy unit at [phone]   from 7am until 3 pm.  [facility], EMERGENCY ROOM PHONE NUMBER:   [phone]  IF A COMPLICATION OR EMERGENCY SITUATION ARISES AND YOU ARE UNABLE TO REACH   YOUR PHYSICIAN - GO DIRECTLY TO THE EMERGENCY ROOM.  I have read or have had read to me these discharge instructions for my   procedure and have received a written copy.  I understand these   instructions and will follow-up with my physician if I have any questions.     __________________________________       _____________________________________  Nurse Signature                                          Patient/Designated   Responsible Party Signature  Faheem Wilkins MD  5/20/2019 8:07:47 AM  This report has been verified and signed electronically.  [employer]

## 2019-05-20 NOTE — H&P
[facility]  Gastroenterology  H&P    Patient Name: Terence Marin  MRN: [id number]  Admission Date: 5/20/2019  Code Status: Full Code    Attending Provider: Faheem Wilkins MD   Primary Care Physician: Endy Long MD  Principal Problem:Alcoholic cirrhosis of liver without ascites    Subjective:     History of Present Illness/Reasons for procedure(s): Here for screening EGD looking for varices.     Past Medical History:   Diagnosis Date    Cancer     prostate    Diabetes mellitus     Hyperlipidemia     Hypertension     Ruptured spleen        No current facility-administered medications on file prior to encounter.      Current Outpatient Medications on File Prior to Encounter   Medication Sig Dispense Refill    amLODIPine (NORVASC) 5 MG tablet Take 5 mg by mouth.      aspirin (ECOTRIN) 81 MG EC tablet Take 81 mg by mouth.      atorvastatin (LIPITOR) 40 MG tablet Take 20 mg by mouth.      carvedilol (COREG) 6.25 MG tablet       cinnamon bark 500 mg capsule Take 1,000 mg by mouth.      docusate sodium (COLACE) 100 MG capsule Take 100 mg by mouth.      fish oil-omega-3 fatty acids 300-1,000 mg capsule Take 2 g by mouth.      irbesartan (AVAPRO) 300 MG tablet TAKE 1 TABLET EVERY DAY. NEED APPOINTMENT.      multivitamin (THERAGRAN) per tablet Take 1 tablet by mouth.      omeprazole (PRILOSEC) 40 MG capsule Take 40 mg by mouth.      sildenafil (REVATIO) 20 mg Tab Take 5 po 45 minutes before intercourse.      sildenafil (VIAGRA) 100 MG tablet Take 100 mg by mouth.      metFORMIN (GLUCOPHAGE-XR) 500 MG 24 hr tablet Take 500 mg by mouth.         Past Surgical History:   Procedure Laterality Date    PROSTATECTOMY      spleenectomy      TONSILLECTOMY         Review of patient's allergies indicates:  No Known Allergies  Family History     Problem Relation (Age of Onset)    Dementia Father    Heart disease Father    Lymphoma Mother    Prostate cancer Father, Brother        Tobacco Use     Smoking status: Former Smoker     Packs/day: 1.50     Years: 10.00     Pack years: 15.00     Types: Cigarettes     Last attempt to quit: 1979     Years since quittin.3    Smokeless tobacco: Never Used   Substance and Sexual Activity    Alcohol use: Yes     Alcohol/week: 33.6 oz     Types: 14 Glasses of wine, 42 Cans of beer per week     Comment: quit     Drug use: No    Sexual activity: Yes     Partners: Female     Review of Systems   Constitutional: Negative.  Negative for activity change, appetite change, chills, diaphoresis, fatigue, fever and unexpected weight change.   HENT: Negative for congestion, ear discharge, facial swelling, hearing loss, nosebleeds, postnasal drip, sinus pressure, sneezing, tinnitus, trouble swallowing and voice change.    Eyes: Negative for photophobia, redness and visual disturbance.   Respiratory: Negative for cough, chest tightness, shortness of breath and wheezing.    Cardiovascular: Negative for chest pain and palpitations.   Gastrointestinal: Positive for abdominal distention. Negative for abdominal pain, blood in stool, constipation, diarrhea, nausea, rectal pain and vomiting.   Genitourinary: Negative for difficulty urinating, discharge, dysuria, flank pain, frequency, hematuria, scrotal swelling, testicular pain and urgency.   Musculoskeletal: Positive for arthralgias. Negative for back pain, gait problem, joint swelling, myalgias and neck stiffness.        Joint Stiffness   Skin: Negative for color change, pallor, rash and wound.   Neurological: Negative for dizziness, tremors, seizures, syncope, facial asymmetry, speech difficulty, weakness, light-headedness, numbness and headaches.   Hematological: Negative for adenopathy. Does not bruise/bleed easily.   Psychiatric/Behavioral: Negative for agitation, confusion, hallucinations, sleep disturbance and suicidal ideas.     Objective:     Vital Signs (Most Recent):  Temp: 98.1 °F (36.7 °C) (19  0729)  Pulse: (!) 52 (05/20/19 0729)  Resp: 18 (05/20/19 0729)  BP: 123/72 (05/20/19 0729)  SpO2: 96 % (05/20/19 0729) Vital Signs (24h Range):  Temp:  [98.1 °F (36.7 °C)] 98.1 °F (36.7 °C)  Pulse:  [52] 52  Resp:  [18] 18  SpO2:  [96 %] 96 %  BP: (123)/(72) 123/72     Weight: 103.7 kg (228 lb 9.9 oz) (05/20/19 0729)  Body mass index is 31.01 kg/m².    No intake or output data in the 24 hours ending 05/20/19 0745    Lines/Drains/Airways     Peripheral Intravenous Line                 Peripheral IV - Single Lumen 05/20/19 0736 20 G Right Hand less than 1 day                Physical Exam   Constitutional: He is oriented to person, place, and time. He appears well-developed.   HENT:   Head: Normocephalic.   Neck: Normal range of motion.   Cardiovascular: Normal rate and regular rhythm.   Pulmonary/Chest: Effort normal and breath sounds normal.   Abdominal: Soft. Bowel sounds are normal. He exhibits no distension. There is no tenderness.   Musculoskeletal: Normal range of motion.   Neurological: He is alert and oriented to person, place, and time.   Skin: Skin is warm and dry.   Psychiatric: He has a normal mood and affect.   Vitals reviewed.      Lab Summary Latest Ref Rng & Units 6/8/2004 8/1/2008 9/9/2008 1/5/2018 6/15/2018   Hemoglobin 14.0 - 18.0 g/dL (None) (None) (None) 14.6 14.0   Hematocrit 40.0 - 54.0 % (None) (None) (None) 42.9 41.5   White count 3.90 - 12.70 K/uL (None) (None) (None) 6.34 5.98   Platelet count 150 - 350 K/uL (None) (None) (None) 178 204   Sodium 136 - 145 mmol/L (None) 138 (None) 141 140   Potassium 3.5 - 5.1 mmol/L (None) 3.8 (None) 4.3 5.0   Calcium 8.7 - 10.5 mg/dL (None) 8.9 (None) 9.6 10.1   Phosphorus 2.7 - 4.5 mg/dl (None) 2.4(L) (None) (None) (None)   Creatinine 0.5 - 1.4 mg/dL (None) 1.1 (None) 0.9 1.3   AST 10 - 40 U/L 22 (None) (None) 25 22   Alk Phos 55 - 135 U/L (None) (None) (None) 112 103   Bilirubin 0.1 - 1.0 mg/dL (None) (None) (None) 0.8 0.8   Glucose 70 - 110 mg/dL (None)  118(H) (None) 108 88   Cholesterol 120 - 199 mg/dL 196 (None) (None) (None) (None)   HDL cholesterol 40 - 63 mg/dL 41.0 (None) (None) (None) (None)   Triglycerides 30 - 150 mg/dL 299(H) (None) (None) (None) (None)   LDL calc - (None) (None) (None) (None) (None)   Total protein 6.0 - 8.4 g/dL (None) (None) (None) 7.4 7.4   Albumin 3.5 - 5.2 g/dL (None) (None) (None) 3.7 4.0   A1C - (None) (None) (None) (None) (None)   PSA Screen 0 - 4.0 ng/ml 1.3 (None) <0.01 (None) (None)   Some recent data might be hidden        Assessment/Plan:     Active Diagnoses:    Diagnosis Date Noted POA    PRINCIPAL PROBLEM:  Alcoholic cirrhosis of liver without ascites [K70.30] 12/14/2017 Yes    Varices, esophageal [I85.00] 05/20/2019 Yes      Problems Resolved During this Admission:       Risks, benefits and alternative options were discussed with the patient. Risks including but not limited to infection, bleeding, heart or respiratory problems and perforation that may require surgery were all explained to the patient. The patient had a chance for questions if there were doubts or concerns about the test. The referring provider will be notified that our consultation is complete and available through the patient's records.    Thanks for letting us participate in the care of this nice patient,      Faheem Wilkins MD  Gastroenterology  [facility]

## 2019-05-21 VITALS
BODY MASS INDEX: 30.97 KG/M2 | OXYGEN SATURATION: 97 % | HEART RATE: 47 BPM | WEIGHT: 228.63 LBS | TEMPERATURE: 98 F | RESPIRATION RATE: 20 BRPM | HEIGHT: 72 IN | SYSTOLIC BLOOD PRESSURE: 115 MMHG | DIASTOLIC BLOOD PRESSURE: 54 MMHG

## 2019-05-21 NOTE — PROGRESS NOTES
Please let the patient know that biopsies taken during procedure are essentially OK. No further action is needed.     FINAL PATHOLOGIC DIAGNOSIS  1. Gastric antrum, biopsy:  - Gastric antral and oxyntic mucosa with findings of reactive gastropathy  - Routine H&E stain is negative for Helicobacter pylori    Faheem Wilkins M.D.

## 2019-08-12 ENCOUNTER — TELEPHONE (OUTPATIENT)
Dept: GASTROENTEROLOGY | Facility: CLINIC | Age: 69
End: 2019-08-12

## 2019-08-12 NOTE — TELEPHONE ENCOUNTER
----- Message from Germania Akins sent at 8/12/2019  1:30 PM CDT -----  Contact: self  states that he had bloodwork with dr belcher last week, can those results be used for 8/15 appt instead...[phone] (home)

## 2019-08-14 ENCOUNTER — TELEPHONE (OUTPATIENT)
Dept: RADIOLOGY | Facility: HOSPITAL | Age: 69
End: 2019-08-14

## 2019-08-15 ENCOUNTER — APPOINTMENT (OUTPATIENT)
Dept: RADIOLOGY | Facility: HOSPITAL | Age: 69
End: 2019-08-15
Attending: NURSE PRACTITIONER
Payer: MEDICARE

## 2019-08-15 DIAGNOSIS — K70.30 ALCOHOLIC CIRRHOSIS OF LIVER WITHOUT ASCITES: ICD-10-CM

## 2019-08-15 DIAGNOSIS — K76.0 FATTY LIVER: ICD-10-CM

## 2019-08-15 PROCEDURE — 76700 US EXAM ABDOM COMPLETE: CPT | Mod: TC,PO

## 2019-08-15 PROCEDURE — 76700 US EXAM ABDOM COMPLETE: CPT | Mod: 26,,, | Performed by: RADIOLOGY

## 2019-08-15 PROCEDURE — 76700 US ABDOMEN COMPLETE: ICD-10-PCS | Mod: 26,,, | Performed by: RADIOLOGY

## 2019-08-22 ENCOUNTER — OFFICE VISIT (OUTPATIENT)
Dept: GASTROENTEROLOGY | Facility: CLINIC | Age: 69
End: 2019-08-22
Payer: MEDICARE

## 2019-08-22 VITALS
DIASTOLIC BLOOD PRESSURE: 70 MMHG | HEART RATE: 60 BPM | WEIGHT: 233.94 LBS | BODY MASS INDEX: 31.69 KG/M2 | SYSTOLIC BLOOD PRESSURE: 110 MMHG | HEIGHT: 72 IN

## 2019-08-22 DIAGNOSIS — K70.30 ALCOHOLIC CIRRHOSIS OF LIVER WITHOUT ASCITES: Primary | ICD-10-CM

## 2019-08-22 PROCEDURE — 3078F PR MOST RECENT DIASTOLIC BLOOD PRESSURE < 80 MM HG: ICD-10-PCS | Mod: CPTII,S$GLB,, | Performed by: NURSE PRACTITIONER

## 2019-08-22 PROCEDURE — 99213 OFFICE O/P EST LOW 20 MIN: CPT | Mod: S$GLB,,, | Performed by: NURSE PRACTITIONER

## 2019-08-22 PROCEDURE — 99999 PR PBB SHADOW E&M-EST. PATIENT-LVL IV: CPT | Mod: PBBFAC,,, | Performed by: NURSE PRACTITIONER

## 2019-08-22 PROCEDURE — 3078F DIAST BP <80 MM HG: CPT | Mod: CPTII,S$GLB,, | Performed by: NURSE PRACTITIONER

## 2019-08-22 PROCEDURE — 3074F SYST BP LT 130 MM HG: CPT | Mod: CPTII,S$GLB,, | Performed by: NURSE PRACTITIONER

## 2019-08-22 PROCEDURE — 99213 PR OFFICE/OUTPT VISIT, EST, LEVL III, 20-29 MIN: ICD-10-PCS | Mod: S$GLB,,, | Performed by: NURSE PRACTITIONER

## 2019-08-22 PROCEDURE — 99999 PR PBB SHADOW E&M-EST. PATIENT-LVL IV: ICD-10-PCS | Mod: PBBFAC,,, | Performed by: NURSE PRACTITIONER

## 2019-08-22 PROCEDURE — 1101F PR PT FALLS ASSESS DOC 0-1 FALLS W/OUT INJ PAST YR: ICD-10-PCS | Mod: CPTII,S$GLB,, | Performed by: NURSE PRACTITIONER

## 2019-08-22 PROCEDURE — 1101F PT FALLS ASSESS-DOCD LE1/YR: CPT | Mod: CPTII,S$GLB,, | Performed by: NURSE PRACTITIONER

## 2019-08-22 PROCEDURE — 3074F PR MOST RECENT SYSTOLIC BLOOD PRESSURE < 130 MM HG: ICD-10-PCS | Mod: CPTII,S$GLB,, | Performed by: NURSE PRACTITIONER

## 2019-08-22 NOTE — PROGRESS NOTES
Clinic Follow Up:  [facility] Follow Up Note    Reason for Follow Up:  The encounter diagnosis was Alcoholic cirrhosis of liver without ascites.    PCP: Endy Long       HPI:  This is a 68 y.o. male here for follow up of the above  Pt states that he has been feeling overall well without any GI complaints.   Denies any abdominal pain.  No nausea or vomiting.  No change in bowel pattern.  No melena or hematochezia. No weight loss.  No upper GI bleeding.  No ascites or BLE.  No overt confusion.  Recent labs and imaging stable.     Review of Systems   Constitutional: Negative for chills, fever, malaise/fatigue and weight loss.   Respiratory: Negative for cough.    Cardiovascular: Negative for chest pain.   Gastrointestinal:        Per HPI   Musculoskeletal: Negative for myalgias.   Skin: Negative for itching and rash.   Neurological: Negative for headaches.   Psychiatric/Behavioral: The patient is not nervous/anxious.        Medical History:  Past Medical History:   Diagnosis Date    Cancer     prostate    Diabetes mellitus     Hyperlipidemia     Hypertension     Ruptured spleen        Surgical History:   Past Surgical History:   Procedure Laterality Date    EGD (ESOPHAGOGASTRODUODENOSCOPY) N/A 2019    Per[facility] END[facility]    PROSTATECTOMY      spleenectomy      TONSILLECTOMY         Family History:   Family History   Problem Relation Age of Onset    Lymphoma Mother     Dementia Father     Prostate cancer Father     Heart disease Father     Prostate cancer Brother        Social History:   Social History     Tobacco Use    Smoking status: Former Smoker     Packs/day: 1.50     Years: 10.00     Pack years: 15.00     Types: Cigarettes     Last attempt to quit: 1979     Years since quittin.6    Smokeless tobacco: Never Used   Substance Use Topics    Alcohol use: Yes     Alcohol/week: 33.6 oz     Types: 14 Glasses of wine, 42 Cans of beer per week      Comment: quit november 2nd    Drug use: No       Allergies: Reviewed    Home Medications:  Current Outpatient Medications on File Prior to Visit   Medication Sig Dispense Refill    amLODIPine (NORVASC) 5 MG tablet Take 5 mg by mouth.      aspirin (ECOTRIN) 81 MG EC tablet Take 81 mg by mouth.      atorvastatin (LIPITOR) 40 MG tablet Take 20 mg by mouth.      carvedilol (COREG) 6.25 MG tablet       cinnamon bark 500 mg capsule Take 1,000 mg by mouth.      docusate sodium (COLACE) 100 MG capsule Take 100 mg by mouth.      fish oil-omega-3 fatty acids 300-1,000 mg capsule Take 2 g by mouth.      irbesartan (AVAPRO) 300 MG tablet TAKE 1 TABLET EVERY DAY. NEED APPOINTMENT.      multivitamin (THERAGRAN) per tablet Take 1 tablet by mouth.      omeprazole (PRILOSEC) 40 MG capsule Take 40 mg by mouth.      sildenafil (REVATIO) 20 mg Tab Take 5 po 45 minutes before intercourse.      sildenafil (VIAGRA) 100 MG tablet Take 100 mg by mouth.      metFORMIN (GLUCOPHAGE-XR) 500 MG 24 hr tablet Take 500 mg by mouth.       No current facility-administered medications on file prior to visit.        Physical Exam:  Vital Signs:  /70 (BP Location: Left arm, Patient Position: Sitting, BP Method: Large (Manual))   Pulse 60   Ht 6' (1.829 m)   Wt 106.1 kg (233 lb 14.5 oz)   BMI 31.72 kg/m²   Body mass index is 31.72 kg/m².  Physical Exam   Constitutional: He is oriented to person, place, and time. He appears well-developed.   HENT:   Head: Normocephalic.   Neck: Normal range of motion.   Cardiovascular: Normal rate and regular rhythm.   Pulmonary/Chest: Effort normal and breath sounds normal.   Abdominal: Soft. Bowel sounds are normal. He exhibits no distension. There is no tenderness.   Musculoskeletal: Normal range of motion.   Neurological: He is alert and oriented to person, place, and time.   Skin: Skin is warm and dry.   Psychiatric: He has a normal mood and affect.   Vitals reviewed.    Labs: Pertinent  labs reviewed.    MELD-Na score: 8 at 8/15/2019 10:19 AM  MELD score: 8 at 8/15/2019 10:19 AM  Calculated from:  Serum Creatinine: 1.1 mg/dL at 8/15/2019 10:19 AM  Serum Sodium: 140 mmol/L (Rounded to 137 mmol/L) at 8/15/2019 10:19 AM  Total Bilirubin: 0.8 mg/dL (Rounded to 1 mg/dL) at 8/15/2019 10:19 AM  INR(ratio): 1.1 at 8/15/2019 10:19 AM  Age: 68 years  EV: EGD 5/19, No EV.  Repeat 3 years  HCC: US 8/19 without lesion or mass    Assessment:  1. Alcoholic cirrhosis of liver without ascites        Recommendations:  - stable without decompensation  - Low MELD  - repeat MELD labs and HCC surveillance every 6 months  - weight loss encouraged  - continue ETOH abstinence.     Return to Clinic:    6 months with pre-clinic labs and imaging.

## 2020-01-31 ENCOUNTER — TELEPHONE (OUTPATIENT)
Dept: GASTROENTEROLOGY | Facility: CLINIC | Age: 70
End: 2020-01-31

## 2020-01-31 NOTE — TELEPHONE ENCOUNTER
Returned patients call and he stated its been roughly 5 or so years since he had a colonoscopy done and he stated his PCP wanted him to get screened again. I informed him I would check with Xiomara and get back with him. He verbalized understanding. Also rescheduled patients appointment to 03/05/20 per patient request.

## 2020-01-31 NOTE — TELEPHONE ENCOUNTER
----- Message from Marian Minaya sent at 1/31/2020  8:06 AM CST -----  Contact: Patient  Patient would like the nurse to give him a call back at ph .[phone], concerning possibly getting a colonoscopy.

## 2020-02-18 ENCOUNTER — PATIENT MESSAGE (OUTPATIENT)
Dept: GASTROENTEROLOGY | Facility: CLINIC | Age: 70
End: 2020-02-18

## 2020-02-18 ENCOUNTER — APPOINTMENT (OUTPATIENT)
Dept: RADIOLOGY | Facility: HOSPITAL | Age: 70
End: 2020-02-18
Attending: NURSE PRACTITIONER
Payer: MEDICARE

## 2020-02-18 DIAGNOSIS — K70.30 ALCOHOLIC CIRRHOSIS OF LIVER WITHOUT ASCITES: ICD-10-CM

## 2020-02-18 PROCEDURE — 76700 US EXAM ABDOM COMPLETE: CPT | Mod: 26,,, | Performed by: RADIOLOGY

## 2020-02-18 PROCEDURE — 76700 US EXAM ABDOM COMPLETE: CPT | Mod: TC,PO

## 2020-02-18 PROCEDURE — 76700 US ABDOMEN COMPLETE: ICD-10-PCS | Mod: 26,,, | Performed by: RADIOLOGY

## 2020-02-21 ENCOUNTER — PATIENT MESSAGE (OUTPATIENT)
Dept: GASTROENTEROLOGY | Facility: CLINIC | Age: 70
End: 2020-02-21

## 2020-02-25 ENCOUNTER — PATIENT MESSAGE (OUTPATIENT)
Dept: GASTROENTEROLOGY | Facility: CLINIC | Age: 70
End: 2020-02-25

## 2020-03-16 ENCOUNTER — LAB VISIT (OUTPATIENT)
Dept: LAB | Facility: HOSPITAL | Age: 70
End: 2020-03-16
Attending: NURSE PRACTITIONER
Payer: MEDICARE

## 2020-03-16 DIAGNOSIS — K70.30 ALCOHOLIC CIRRHOSIS OF LIVER WITHOUT ASCITES: ICD-10-CM

## 2020-03-16 LAB
ALBUMIN SERPL BCP-MCNC: 4.1 G/DL (ref 3.5–5.2)
ALP SERPL-CCNC: 112 U/L (ref 55–135)
ALT SERPL W/O P-5'-P-CCNC: 35 U/L (ref 10–44)
ANION GAP SERPL CALC-SCNC: 9 MMOL/L (ref 8–16)
AST SERPL-CCNC: 25 U/L (ref 10–40)
BASOPHILS # BLD AUTO: 0.04 K/UL (ref 0–0.2)
BASOPHILS NFR BLD: 0.6 % (ref 0–1.9)
BILIRUB SERPL-MCNC: 0.7 MG/DL (ref 0.1–1)
BUN SERPL-MCNC: 18 MG/DL (ref 8–23)
CALCIUM SERPL-MCNC: 9.3 MG/DL (ref 8.7–10.5)
CHLORIDE SERPL-SCNC: 106 MMOL/L (ref 95–110)
CO2 SERPL-SCNC: 27 MMOL/L (ref 23–29)
CREAT SERPL-MCNC: 1.2 MG/DL (ref 0.5–1.4)
DIFFERENTIAL METHOD: ABNORMAL
EOSINOPHIL # BLD AUTO: 0.2 K/UL (ref 0–0.5)
EOSINOPHIL NFR BLD: 2.4 % (ref 0–8)
ERYTHROCYTE [DISTWIDTH] IN BLOOD BY AUTOMATED COUNT: 14.4 % (ref 11.5–14.5)
EST. GFR  (AFRICAN AMERICAN): >60 ML/MIN/1.73 M^2
EST. GFR  (NON AFRICAN AMERICAN): >60 ML/MIN/1.73 M^2
GLUCOSE SERPL-MCNC: 128 MG/DL (ref 70–110)
HCT VFR BLD AUTO: 43.8 % (ref 40–54)
HGB BLD-MCNC: 13.6 G/DL (ref 14–18)
IMM GRANULOCYTES # BLD AUTO: 0.01 K/UL (ref 0–0.04)
IMM GRANULOCYTES NFR BLD AUTO: 0.2 % (ref 0–0.5)
INR PPP: 1 (ref 0.8–1.2)
LYMPHOCYTES # BLD AUTO: 1.8 K/UL (ref 1–4.8)
LYMPHOCYTES NFR BLD: 28.4 % (ref 18–48)
MCH RBC QN AUTO: 29.3 PG (ref 27–31)
MCHC RBC AUTO-ENTMCNC: 31.1 G/DL (ref 32–36)
MCV RBC AUTO: 94 FL (ref 82–98)
MONOCYTES # BLD AUTO: 0.5 K/UL (ref 0.3–1)
MONOCYTES NFR BLD: 8.1 % (ref 4–15)
NEUTROPHILS # BLD AUTO: 3.8 K/UL (ref 1.8–7.7)
NEUTROPHILS NFR BLD: 60.3 % (ref 38–73)
NRBC BLD-RTO: 0 /100 WBC
PLATELET # BLD AUTO: 235 K/UL (ref 150–350)
PMV BLD AUTO: 11.9 FL (ref 9.2–12.9)
POTASSIUM SERPL-SCNC: 4.7 MMOL/L (ref 3.5–5.1)
PROT SERPL-MCNC: 7.7 G/DL (ref 6–8.4)
PROTHROMBIN TIME: 10.5 SEC (ref 9–12.5)
RBC # BLD AUTO: 4.64 M/UL (ref 4.6–6.2)
SODIUM SERPL-SCNC: 142 MMOL/L (ref 136–145)
WBC # BLD AUTO: 6.31 K/UL (ref 3.9–12.7)

## 2020-03-16 PROCEDURE — 85610 PROTHROMBIN TIME: CPT

## 2020-03-16 PROCEDURE — 80053 COMPREHEN METABOLIC PANEL: CPT

## 2020-03-16 PROCEDURE — 85025 COMPLETE CBC W/AUTO DIFF WBC: CPT

## 2020-03-16 PROCEDURE — 82105 ALPHA-FETOPROTEIN SERUM: CPT

## 2020-03-16 PROCEDURE — 36415 COLL VENOUS BLD VENIPUNCTURE: CPT | Mod: PO

## 2020-03-17 ENCOUNTER — PATIENT MESSAGE (OUTPATIENT)
Dept: GASTROENTEROLOGY | Facility: CLINIC | Age: 70
End: 2020-03-17

## 2020-03-17 LAB — AFP SERPL-MCNC: 2 NG/ML (ref 0–8.4)

## 2020-03-25 ENCOUNTER — PATIENT MESSAGE (OUTPATIENT)
Dept: GASTROENTEROLOGY | Facility: CLINIC | Age: 70
End: 2020-03-25

## 2020-03-30 ENCOUNTER — TELEPHONE (OUTPATIENT)
Dept: GASTROENTEROLOGY | Facility: CLINIC | Age: 70
End: 2020-03-30

## 2020-03-30 NOTE — TELEPHONE ENCOUNTER
Called patient and explained to patient about his virtual appt that is scheduled with Xiomara Johnston and how to join. Patient verbalized understanding.

## 2020-03-30 NOTE — TELEPHONE ENCOUNTER
----- Message from Roxana Alonzo sent at 3/30/2020  4:06 PM CDT -----  Contact: self [phone]  Pt would like return call back regarding question about video visit. Please call back at [phone]. x MMd

## 2020-03-31 ENCOUNTER — PATIENT MESSAGE (OUTPATIENT)
Dept: GASTROENTEROLOGY | Facility: CLINIC | Age: 70
End: 2020-03-31

## 2020-03-31 ENCOUNTER — OFFICE VISIT (OUTPATIENT)
Dept: GASTROENTEROLOGY | Facility: CLINIC | Age: 70
End: 2020-03-31
Payer: MEDICARE

## 2020-03-31 DIAGNOSIS — K70.30 ALCOHOLIC CIRRHOSIS OF LIVER WITHOUT ASCITES: Primary | ICD-10-CM

## 2020-03-31 PROCEDURE — 99213 OFFICE O/P EST LOW 20 MIN: CPT | Mod: 95,,, | Performed by: NURSE PRACTITIONER

## 2020-03-31 PROCEDURE — 1159F PR MEDICATION LIST DOCUMENTED IN MEDICAL RECORD: ICD-10-PCS | Mod: 95,,, | Performed by: NURSE PRACTITIONER

## 2020-03-31 PROCEDURE — 99213 PR OFFICE/OUTPT VISIT, EST, LEVL III, 20-29 MIN: ICD-10-PCS | Mod: 95,,, | Performed by: NURSE PRACTITIONER

## 2020-03-31 PROCEDURE — 1159F MED LIST DOCD IN RCRD: CPT | Mod: 95,,, | Performed by: NURSE PRACTITIONER

## 2020-03-31 PROCEDURE — 1101F PR PT FALLS ASSESS DOC 0-1 FALLS W/OUT INJ PAST YR: ICD-10-PCS | Mod: CPTII,95,, | Performed by: NURSE PRACTITIONER

## 2020-03-31 PROCEDURE — 1101F PT FALLS ASSESS-DOCD LE1/YR: CPT | Mod: CPTII,95,, | Performed by: NURSE PRACTITIONER

## 2020-03-31 NOTE — PROGRESS NOTES
Clinic Follow Up:  [facility] Follow Up Note    Reason for Follow Up:  The encounter diagnosis was Alcoholic cirrhosis of liver without ascites.    PCP: Endy Long       The patient location is: His home in [place]  The chief complaint leading to consultation is:  F/U ETOH cirrhosis  Visit type: Virtual visit with synchronous audio and video  Total time spent with patient: 15 minutes  Each patient to whom he or she provides medical services by telemedicine is:  (1) informed of the relationship between the physician and patient and the respective role of any other health care provider with respect to management of the patient; and (2) notified that he or she may decline to receive medical services by telemedicine and may withdraw from such care at any time.    HPI:  This is a 69 y.o. male here for follow up of the above  Pt states that he has been feeling well without GI complaints  Denies any abdominal pain.  No nausea or vomiting.  No change in bowel pattern.  No melena or hematochezia. No weight loss.  No upper GI bleeding.  No ascites or BLE.  No overt confusion.      Review of Systems   Constitutional: Negative for chills, fever, malaise/fatigue and weight loss.   Respiratory: Negative for cough.    Cardiovascular: Negative for chest pain.   Gastrointestinal:        Per HPI   Musculoskeletal: Negative for myalgias.   Skin: Negative for itching and rash.   Neurological: Negative for headaches.   Psychiatric/Behavioral: The patient is not nervous/anxious.        Medical History:  Past Medical History:   Diagnosis Date    Cancer     prostate    Diabetes mellitus     Hyperlipidemia     Hypertension     Ruptured spleen        Surgical History:   Past Surgical History:   Procedure Laterality Date    ESOPHAGOGASTRODUODENOSCOPY N/A 5/20/2019    Procedure: EGD (ESOPHAGOGASTRODUODENOSCOPY);  Surgeon: Faheem Wilkins MD;  [facility];  Service: Endoscopy;  Laterality: N/A;     PROSTATECTOMY      spleenectomy      TONSILLECTOMY         Family History:   Family History   Problem Relation Age of Onset    Lymphoma Mother     Dementia Father     Prostate cancer Father     Heart disease Father     Prostate cancer Brother        Social History:   Social History     Tobacco Use    Smoking status: Former Smoker     Packs/day: 1.50     Years: 10.00     Pack years: 15.00     Types: Cigarettes     Last attempt to quit: 1979     Years since quittin.2    Smokeless tobacco: Never Used   Substance Use Topics    Alcohol use: Yes     Alcohol/week: 56.0 standard drinks     Types: 14 Glasses of wine, 42 Cans of beer per week     Comment: quit     Drug use: No       Allergies: Reviewed    Home Medications:  Current Outpatient Medications on File Prior to Visit   Medication Sig Dispense Refill    amLODIPine (NORVASC) 5 MG tablet Take 5 mg by mouth.      aspirin (ECOTRIN) 81 MG EC tablet Take 81 mg by mouth.      atorvastatin (LIPITOR) 40 MG tablet Take 20 mg by mouth.      carvedilol (COREG) 6.25 MG tablet       cinnamon bark 500 mg capsule Take 1,000 mg by mouth.      docusate sodium (COLACE) 100 MG capsule Take 100 mg by mouth.      fish oil-omega-3 fatty acids 300-1,000 mg capsule Take 2 g by mouth.      irbesartan (AVAPRO) 300 MG tablet TAKE 1 TABLET EVERY DAY. NEED APPOINTMENT.      metFORMIN (GLUCOPHAGE-XR) 500 MG 24 hr tablet Take 500 mg by mouth.      multivitamin (THERAGRAN) per tablet Take 1 tablet by mouth.      omeprazole (PRILOSEC) 40 MG capsule Take 40 mg by mouth.      sildenafil (REVATIO) 20 mg Tab Take 5 po 45 minutes before intercourse.      sildenafil (VIAGRA) 100 MG tablet Take 100 mg by mouth.       No current facility-administered medications on file prior to visit.        Physical Exam:  Vital Signs:  There were no vitals taken for this visit.  There is no height or weight on file to calculate BMI.  Physical Exam   Constitutional: He is  oriented to person, place, and time. He appears well-developed.   HENT:   Head: Normocephalic.   Eyes: No scleral icterus.   Neck: Normal range of motion.   Pulmonary/Chest: Effort normal.   Neurological: He is alert and oriented to person, place, and time.   Skin: Skin is dry.   Psychiatric: He has a normal mood and affect.       Labs: Pertinent labs reviewed.  MELD-Na score: 8 at 3/16/2020  8:30 AM  MELD score: 8 at 3/16/2020  8:30 AM  Calculated from:  Serum Creatinine: 1.2 mg/dL at 3/16/2020  8:30 AM  Serum Sodium: 142 mmol/L (Rounded to 137 mmol/L) at 3/16/2020  8:30 AM  Total Bilirubin: 0.7 mg/dL (Rounded to 1 mg/dL) at 3/16/2020  8:30 AM  INR(ratio): 1.0 at 3/16/2020  8:30 AM  Age: 69 years  EV: EGD 5/19 without EV  HCC: US 2/2020 without lesion or mass     Assessment:  1. Alcoholic cirrhosis of liver without ascites        Recommendations:  Stable without any new complaints  Low MELD  Continue with current medications  Continue MELD labs and HCC surveillance every 6 months  Repeat EGD 3 years  Due for repeat colonoscopy 12/2020 for hx of polyps     Return to Clinic:    6 months with pre-clinic labs and imaging.

## 2021-02-05 ENCOUNTER — TELEPHONE (OUTPATIENT)
Dept: RADIOLOGY | Facility: HOSPITAL | Age: 71
End: 2021-02-05

## 2021-02-08 ENCOUNTER — PATIENT MESSAGE (OUTPATIENT)
Dept: HEPATOLOGY | Facility: CLINIC | Age: 71
End: 2021-02-08

## 2021-02-08 ENCOUNTER — HOSPITAL ENCOUNTER (OUTPATIENT)
Dept: RADIOLOGY | Facility: HOSPITAL | Age: 71
Discharge: HOME | End: 2021-02-08
Attending: NURSE PRACTITIONER
Payer: MEDICARE

## 2021-02-08 DIAGNOSIS — K70.30 ALCOHOLIC CIRRHOSIS OF LIVER WITHOUT ASCITES: ICD-10-CM

## 2021-02-08 PROCEDURE — 76700 US EXAM ABDOM COMPLETE: CPT | Mod: 26,,, | Performed by: RADIOLOGY

## 2021-02-08 PROCEDURE — 76700 US ABDOMEN COMPLETE: ICD-10-PCS | Mod: 26,,, | Performed by: RADIOLOGY

## 2021-02-08 PROCEDURE — 76700 US EXAM ABDOM COMPLETE: CPT | Mod: TC

## 2021-03-01 ENCOUNTER — LAB VISIT (OUTPATIENT)
Dept: LAB | Facility: HOSPITAL | Age: 71
End: 2021-03-01
Attending: NURSE PRACTITIONER
Payer: MEDICARE

## 2021-03-01 ENCOUNTER — OFFICE VISIT (OUTPATIENT)
Dept: HEPATOLOGY | Facility: CLINIC | Age: 71
End: 2021-03-01
Payer: MEDICARE

## 2021-03-01 VITALS
SYSTOLIC BLOOD PRESSURE: 130 MMHG | DIASTOLIC BLOOD PRESSURE: 72 MMHG | HEIGHT: 72 IN | WEIGHT: 222.44 LBS | BODY MASS INDEX: 30.13 KG/M2

## 2021-03-01 DIAGNOSIS — K70.30 ALCOHOLIC CIRRHOSIS OF LIVER WITHOUT ASCITES: Primary | ICD-10-CM

## 2021-03-01 DIAGNOSIS — Z86.010 HISTORY OF COLON POLYPS: ICD-10-CM

## 2021-03-01 DIAGNOSIS — K70.30 ALCOHOLIC CIRRHOSIS OF LIVER WITHOUT ASCITES: ICD-10-CM

## 2021-03-01 LAB
AFP SERPL-MCNC: 1.6 NG/ML (ref 0–8.4)
INR PPP: 1 (ref 0.8–1.2)
PROTHROMBIN TIME: 10.9 SEC (ref 9–12.5)

## 2021-03-01 PROCEDURE — 85610 PROTHROMBIN TIME: CPT

## 2021-03-01 PROCEDURE — 99999 PR PBB SHADOW E&M-EST. PATIENT-LVL IV: CPT | Mod: PBBFAC,,, | Performed by: NURSE PRACTITIONER

## 2021-03-01 PROCEDURE — 1126F AMNT PAIN NOTED NONE PRSNT: CPT | Mod: S$GLB,,, | Performed by: NURSE PRACTITIONER

## 2021-03-01 PROCEDURE — 3075F PR MOST RECENT SYSTOLIC BLOOD PRESS GE 130-139MM HG: ICD-10-PCS | Mod: CPTII,S$GLB,, | Performed by: NURSE PRACTITIONER

## 2021-03-01 PROCEDURE — 36415 COLL VENOUS BLD VENIPUNCTURE: CPT

## 2021-03-01 PROCEDURE — 1126F PR PAIN SEVERITY QUANTIFIED, NO PAIN PRESENT: ICD-10-PCS | Mod: S$GLB,,, | Performed by: NURSE PRACTITIONER

## 2021-03-01 PROCEDURE — 3008F PR BODY MASS INDEX (BMI) DOCUMENTED: ICD-10-PCS | Mod: CPTII,S$GLB,, | Performed by: NURSE PRACTITIONER

## 2021-03-01 PROCEDURE — 99999 PR PBB SHADOW E&M-EST. PATIENT-LVL IV: ICD-10-PCS | Mod: PBBFAC,,, | Performed by: NURSE PRACTITIONER

## 2021-03-01 PROCEDURE — 1159F PR MEDICATION LIST DOCUMENTED IN MEDICAL RECORD: ICD-10-PCS | Mod: S$GLB,,, | Performed by: NURSE PRACTITIONER

## 2021-03-01 PROCEDURE — 99214 OFFICE O/P EST MOD 30 MIN: CPT | Mod: S$GLB,,, | Performed by: NURSE PRACTITIONER

## 2021-03-01 PROCEDURE — 1101F PT FALLS ASSESS-DOCD LE1/YR: CPT | Mod: CPTII,S$GLB,, | Performed by: NURSE PRACTITIONER

## 2021-03-01 PROCEDURE — 3078F PR MOST RECENT DIASTOLIC BLOOD PRESSURE < 80 MM HG: ICD-10-PCS | Mod: CPTII,S$GLB,, | Performed by: NURSE PRACTITIONER

## 2021-03-01 PROCEDURE — 82105 ALPHA-FETOPROTEIN SERUM: CPT

## 2021-03-01 PROCEDURE — 99214 PR OFFICE/OUTPT VISIT, EST, LEVL IV, 30-39 MIN: ICD-10-PCS | Mod: S$GLB,,, | Performed by: NURSE PRACTITIONER

## 2021-03-01 PROCEDURE — 3008F BODY MASS INDEX DOCD: CPT | Mod: CPTII,S$GLB,, | Performed by: NURSE PRACTITIONER

## 2021-03-01 PROCEDURE — 3078F DIAST BP <80 MM HG: CPT | Mod: CPTII,S$GLB,, | Performed by: NURSE PRACTITIONER

## 2021-03-01 PROCEDURE — 3075F SYST BP GE 130 - 139MM HG: CPT | Mod: CPTII,S$GLB,, | Performed by: NURSE PRACTITIONER

## 2021-03-01 PROCEDURE — 3288F PR FALLS RISK ASSESSMENT DOCUMENTED: ICD-10-PCS | Mod: CPTII,S$GLB,, | Performed by: NURSE PRACTITIONER

## 2021-03-01 PROCEDURE — 3288F FALL RISK ASSESSMENT DOCD: CPT | Mod: CPTII,S$GLB,, | Performed by: NURSE PRACTITIONER

## 2021-03-01 PROCEDURE — 1101F PR PT FALLS ASSESS DOC 0-1 FALLS W/OUT INJ PAST YR: ICD-10-PCS | Mod: CPTII,S$GLB,, | Performed by: NURSE PRACTITIONER

## 2021-03-01 PROCEDURE — 1159F MED LIST DOCD IN RCRD: CPT | Mod: S$GLB,,, | Performed by: NURSE PRACTITIONER

## 2021-03-01 RX ORDER — SODIUM, POTASSIUM,MAG SULFATES 17.5-3.13G
1 SOLUTION, RECONSTITUTED, ORAL ORAL ONCE
Qty: 1 BOTTLE | Refills: 0 | Status: SHIPPED | OUTPATIENT
Start: 2021-03-01 | End: 2021-03-01

## 2021-03-02 ENCOUNTER — PATIENT MESSAGE (OUTPATIENT)
Dept: HEPATOLOGY | Facility: CLINIC | Age: 71
End: 2021-03-02

## 2021-03-22 ENCOUNTER — LAB VISIT (OUTPATIENT)
Dept: OTOLARYNGOLOGY | Facility: CLINIC | Age: 71
End: 2021-03-22
Payer: MEDICARE

## 2021-03-22 DIAGNOSIS — K70.30 ALCOHOLIC CIRRHOSIS OF LIVER WITHOUT ASCITES: ICD-10-CM

## 2021-03-22 PROCEDURE — U0005 INFEC AGEN DETEC AMPLI PROBE: HCPCS | Performed by: NURSE PRACTITIONER

## 2021-03-22 PROCEDURE — U0003 INFECTIOUS AGENT DETECTION BY NUCLEIC ACID (DNA OR RNA); SEVERE ACUTE RESPIRATORY SYNDROME CORONAVIRUS 2 (SARS-COV-2) (CORONAVIRUS DISEASE [COVID-19]), AMPLIFIED PROBE TECHNIQUE, MAKING USE OF HIGH THROUGHPUT TECHNOLOGIES AS DESCRIBED BY CMS-2020-01-R: HCPCS | Performed by: NURSE PRACTITIONER

## 2021-03-23 ENCOUNTER — TELEPHONE (OUTPATIENT)
Dept: ENDOSCOPY | Facility: HOSPITAL | Age: 71
End: 2021-03-23

## 2021-03-24 LAB — SARS-COV-2 RNA RESP QL NAA+PROBE: NOT DETECTED

## 2021-03-25 ENCOUNTER — HOSPITAL ENCOUNTER (OUTPATIENT)
Facility: HOSPITAL | Age: 71
Discharge: HOME | End: 2021-03-25
Attending: INTERNAL MEDICINE | Admitting: INTERNAL MEDICINE
Payer: MEDICARE

## 2021-03-25 ENCOUNTER — ANESTHESIA EVENT (OUTPATIENT)
Dept: ENDOSCOPY | Facility: HOSPITAL | Age: 71
End: 2021-03-25
Payer: MEDICARE

## 2021-03-25 ENCOUNTER — ANESTHESIA (OUTPATIENT)
Dept: ENDOSCOPY | Facility: HOSPITAL | Age: 71
End: 2021-03-25
Payer: MEDICARE

## 2021-03-25 DIAGNOSIS — Z86.010 HISTORY OF COLON POLYPS: Primary | ICD-10-CM

## 2021-03-25 PROBLEM — Z86.0100 HISTORY OF COLON POLYPS: Status: ACTIVE | Noted: 2021-03-25

## 2021-03-25 PROCEDURE — 45385 COLONOSCOPY W/LESION REMOVAL: CPT | Performed by: INTERNAL MEDICINE

## 2021-03-25 PROCEDURE — 27201089 HC SNARE, DISP (ANY): Performed by: INTERNAL MEDICINE

## 2021-03-25 PROCEDURE — 88305 TISSUE EXAM BY PATHOLOGIST: ICD-10-PCS | Mod: 26,,, | Performed by: STUDENT IN AN ORGANIZED HEALTH CARE EDUCATION/TRAINING PROGRAM

## 2021-03-25 PROCEDURE — 25000003 PHARM REV CODE 250: Performed by: NURSE ANESTHETIST, CERTIFIED REGISTERED

## 2021-03-25 PROCEDURE — 63600175 PHARM REV CODE 636 W HCPCS: Performed by: NURSE ANESTHETIST, CERTIFIED REGISTERED

## 2021-03-25 PROCEDURE — 45380 PR COLONOSCOPY,BIOPSY: ICD-10-PCS | Mod: 59,,, | Performed by: INTERNAL MEDICINE

## 2021-03-25 PROCEDURE — 45380 COLONOSCOPY AND BIOPSY: CPT | Mod: 59,,, | Performed by: INTERNAL MEDICINE

## 2021-03-25 PROCEDURE — 88305 TISSUE EXAM BY PATHOLOGIST: CPT | Performed by: STUDENT IN AN ORGANIZED HEALTH CARE EDUCATION/TRAINING PROGRAM

## 2021-03-25 PROCEDURE — 45385 PR COLONOSCOPY,REMV LESN,SNARE: ICD-10-PCS | Mod: PT,,, | Performed by: INTERNAL MEDICINE

## 2021-03-25 PROCEDURE — 45385 COLONOSCOPY W/LESION REMOVAL: CPT | Mod: PT,,, | Performed by: INTERNAL MEDICINE

## 2021-03-25 PROCEDURE — 27201012 HC FORCEPS, HOT/COLD, DISP: Performed by: INTERNAL MEDICINE

## 2021-03-25 PROCEDURE — 88305 TISSUE EXAM BY PATHOLOGIST: CPT | Mod: 26,,, | Performed by: STUDENT IN AN ORGANIZED HEALTH CARE EDUCATION/TRAINING PROGRAM

## 2021-03-25 PROCEDURE — 45380 COLONOSCOPY AND BIOPSY: CPT | Performed by: INTERNAL MEDICINE

## 2021-03-25 PROCEDURE — 37000008 HC ANESTHESIA 1ST 15 MINUTES: Performed by: INTERNAL MEDICINE

## 2021-03-25 PROCEDURE — 37000009 HC ANESTHESIA EA ADD 15 MINS: Performed by: INTERNAL MEDICINE

## 2021-03-25 RX ORDER — PROPOFOL 10 MG/ML
VIAL (ML) INTRAVENOUS
Status: DISCONTINUED | OUTPATIENT
Start: 2021-03-25 | End: 2021-03-25

## 2021-03-25 RX ORDER — LIDOCAINE HYDROCHLORIDE 10 MG/ML
INJECTION, SOLUTION EPIDURAL; INFILTRATION; INTRACAUDAL; PERINEURAL
Status: DISCONTINUED | OUTPATIENT
Start: 2021-03-25 | End: 2021-03-25

## 2021-03-25 RX ORDER — SODIUM CHLORIDE, SODIUM LACTATE, POTASSIUM CHLORIDE, CALCIUM CHLORIDE 600; 310; 30; 20 MG/100ML; MG/100ML; MG/100ML; MG/100ML
INJECTION, SOLUTION INTRAVENOUS CONTINUOUS PRN
Status: DISCONTINUED | OUTPATIENT
Start: 2021-03-25 | End: 2021-03-25

## 2021-03-25 RX ADMIN — SODIUM CHLORIDE, SODIUM LACTATE, POTASSIUM CHLORIDE, AND CALCIUM CHLORIDE: .6; .31; .03; .02 INJECTION, SOLUTION INTRAVENOUS at 10:03

## 2021-03-25 RX ADMIN — PROPOFOL 30 MG: 10 INJECTION, EMULSION INTRAVENOUS at 10:03

## 2021-03-25 RX ADMIN — PROPOFOL 20 MG: 10 INJECTION, EMULSION INTRAVENOUS at 10:03

## 2021-03-25 RX ADMIN — PROPOFOL 80 MG: 10 INJECTION, EMULSION INTRAVENOUS at 10:03

## 2021-03-25 RX ADMIN — LIDOCAINE HYDROCHLORIDE 50 MG: 10 INJECTION, SOLUTION EPIDURAL; INFILTRATION; INTRACAUDAL; PERINEURAL at 10:03

## 2021-03-26 VITALS
HEART RATE: 50 BPM | DIASTOLIC BLOOD PRESSURE: 70 MMHG | RESPIRATION RATE: 17 BRPM | TEMPERATURE: 98 F | OXYGEN SATURATION: 98 % | WEIGHT: 217.13 LBS | BODY MASS INDEX: 29.45 KG/M2 | SYSTOLIC BLOOD PRESSURE: 126 MMHG

## 2021-03-29 LAB
FINAL PATHOLOGIC DIAGNOSIS: NORMAL
GROSS: NORMAL
Lab: NORMAL
MICROSCOPIC EXAM: NORMAL

## 2021-04-29 ENCOUNTER — PATIENT MESSAGE (OUTPATIENT)
Dept: RESEARCH | Facility: HOSPITAL | Age: 71
End: 2021-04-29

## 2021-08-31 ENCOUNTER — TELEPHONE (OUTPATIENT)
Dept: RADIOLOGY | Facility: HOSPITAL | Age: 71
End: 2021-08-31

## 2021-09-27 ENCOUNTER — TELEPHONE (OUTPATIENT)
Dept: RADIOLOGY | Facility: HOSPITAL | Age: 71
End: 2021-09-27

## 2021-09-28 ENCOUNTER — HOSPITAL ENCOUNTER (OUTPATIENT)
Dept: RADIOLOGY | Facility: HOSPITAL | Age: 71
Discharge: HOME | End: 2021-09-28
Attending: NURSE PRACTITIONER
Payer: MEDICARE

## 2021-09-28 DIAGNOSIS — K70.30 ALCOHOLIC CIRRHOSIS OF LIVER WITHOUT ASCITES: ICD-10-CM

## 2021-09-28 PROCEDURE — 76700 US EXAM ABDOM COMPLETE: CPT | Mod: TC

## 2021-09-28 PROCEDURE — 76700 US EXAM ABDOM COMPLETE: CPT | Mod: 26,,, | Performed by: RADIOLOGY

## 2021-09-28 PROCEDURE — 76700 US ABDOMEN COMPLETE: ICD-10-PCS | Mod: 26,,, | Performed by: RADIOLOGY

## 2021-09-29 ENCOUNTER — PATIENT MESSAGE (OUTPATIENT)
Dept: HEPATOLOGY | Facility: CLINIC | Age: 71
End: 2021-09-29

## 2022-11-30 NOTE — TELEPHONE ENCOUNTER
----- Message from Germania Akins sent at 1/25/2019  1:25 PM CST -----  pt needs to know if he should be seen w/i 6 mths or a year...[phone] (home)      Rapid strep was negative, based on physical exam it appears that you have a viral illness.  Sinus symptoms may last 10-14 days and cough may linger for 3-4 weeks.    You may find the following helpful to reduce symptoms (generics are fine, do not take any of these medications if you're allergic to any of the ingredients)  Congestion: Avoid decongestants as these raise blood pressure. Zyrtec or Claritin daily for next 7-10 days. Afrin nasal spray, follow package directions, do not use more that 3 days.   Sore throat: Salt water gargles, honey lemon tea, sucking on ice chips  Fever and/or body aches:  Ibuprofen or Tylenol (acetaminophen) follow package directions  Mucinex (guaifenicin) as directed on the package to thin secretions.  Delsym (dextromethorphan) for cough, do not take this medication if you are taking an antidepressant or are taking opioid pain medications or cough medicines.  Oceans (saline) nasal spray, one spray in each nostril hourly for congestion.  Put a cool mist vaporizer in your bedroom. Increase fluid intake.   Elevate the head of your bed, or use multiple pillows to help with sinus drainage.  I do not recommend taking medications like Nyquil, Dayquil, alkaselzer plus etc as these have multiple medications and you may be getting medications that you do not need or too much if combined with those listed above.    You should be evaluated in the Emergency Department for any difficulty breathing or swallowing, shortness of breath, chest pain, pain with mouth opening, any worsening of existing symptoms, or if new or concerning symptoms develop. Follow up with your primary care provider in the next 5-7 days if symptoms not improving, sooner if worsening    General information on your diagnoses provided at the end of this visit summary

## 2022-12-06 ENCOUNTER — PATIENT MESSAGE (OUTPATIENT)
Dept: RESEARCH | Facility: HOSPITAL | Age: 72
End: 2022-12-06
Payer: MEDICARE

## 2023-06-27 ENCOUNTER — HOSPITAL ENCOUNTER (OUTPATIENT)
Dept: PREADMISSION TESTING | Facility: HOSPITAL | Age: 73
Discharge: HOME | End: 2023-06-27
Attending: NURSE PRACTITIONER
Payer: MEDICARE

## 2023-06-27 ENCOUNTER — OFFICE VISIT (OUTPATIENT)
Dept: HEPATOLOGY | Facility: CLINIC | Age: 73
End: 2023-06-27
Payer: MEDICARE

## 2023-06-27 VITALS
HEIGHT: 72 IN | WEIGHT: 226.63 LBS | SYSTOLIC BLOOD PRESSURE: 122 MMHG | DIASTOLIC BLOOD PRESSURE: 76 MMHG | HEART RATE: 48 BPM | BODY MASS INDEX: 30.7 KG/M2

## 2023-06-27 DIAGNOSIS — K70.30 ALCOHOLIC CIRRHOSIS OF LIVER WITHOUT ASCITES: ICD-10-CM

## 2023-06-27 DIAGNOSIS — Z79.01 CURRENT USE OF LONG TERM ANTICOAGULATION: ICD-10-CM

## 2023-06-27 DIAGNOSIS — K70.30 ALCOHOLIC CIRRHOSIS OF LIVER WITHOUT ASCITES: Primary | ICD-10-CM

## 2023-06-27 PROCEDURE — 3288F PR FALLS RISK ASSESSMENT DOCUMENTED: ICD-10-PCS | Mod: CPTII,S$GLB,, | Performed by: NURSE PRACTITIONER

## 2023-06-27 PROCEDURE — 99999 PR PBB SHADOW E&M-EST. PATIENT-LVL IV: ICD-10-PCS | Mod: PBBFAC,,, | Performed by: NURSE PRACTITIONER

## 2023-06-27 PROCEDURE — 1100F PR PT FALLS ASSESS DOC 2+ FALLS/FALL W/INJURY/YR: ICD-10-PCS | Mod: CPTII,S$GLB,, | Performed by: NURSE PRACTITIONER

## 2023-06-27 PROCEDURE — 3078F PR MOST RECENT DIASTOLIC BLOOD PRESSURE < 80 MM HG: ICD-10-PCS | Mod: CPTII,S$GLB,, | Performed by: NURSE PRACTITIONER

## 2023-06-27 PROCEDURE — 3074F PR MOST RECENT SYSTOLIC BLOOD PRESSURE < 130 MM HG: ICD-10-PCS | Mod: CPTII,S$GLB,, | Performed by: NURSE PRACTITIONER

## 2023-06-27 PROCEDURE — 3288F FALL RISK ASSESSMENT DOCD: CPT | Mod: CPTII,S$GLB,, | Performed by: NURSE PRACTITIONER

## 2023-06-27 PROCEDURE — 1126F AMNT PAIN NOTED NONE PRSNT: CPT | Mod: CPTII,S$GLB,, | Performed by: NURSE PRACTITIONER

## 2023-06-27 PROCEDURE — 3008F BODY MASS INDEX DOCD: CPT | Mod: CPTII,S$GLB,, | Performed by: NURSE PRACTITIONER

## 2023-06-27 PROCEDURE — 1159F MED LIST DOCD IN RCRD: CPT | Mod: CPTII,S$GLB,, | Performed by: NURSE PRACTITIONER

## 2023-06-27 PROCEDURE — 3074F SYST BP LT 130 MM HG: CPT | Mod: CPTII,S$GLB,, | Performed by: NURSE PRACTITIONER

## 2023-06-27 PROCEDURE — 1126F PR PAIN SEVERITY QUANTIFIED, NO PAIN PRESENT: ICD-10-PCS | Mod: CPTII,S$GLB,, | Performed by: NURSE PRACTITIONER

## 2023-06-27 PROCEDURE — 99214 PR OFFICE/OUTPT VISIT, EST, LEVL IV, 30-39 MIN: ICD-10-PCS | Mod: S$GLB,,, | Performed by: NURSE PRACTITIONER

## 2023-06-27 PROCEDURE — 99214 OFFICE O/P EST MOD 30 MIN: CPT | Mod: S$GLB,,, | Performed by: NURSE PRACTITIONER

## 2023-06-27 PROCEDURE — 3078F DIAST BP <80 MM HG: CPT | Mod: CPTII,S$GLB,, | Performed by: NURSE PRACTITIONER

## 2023-06-27 PROCEDURE — 1100F PTFALLS ASSESS-DOCD GE2>/YR: CPT | Mod: CPTII,S$GLB,, | Performed by: NURSE PRACTITIONER

## 2023-06-27 PROCEDURE — 3008F PR BODY MASS INDEX (BMI) DOCUMENTED: ICD-10-PCS | Mod: CPTII,S$GLB,, | Performed by: NURSE PRACTITIONER

## 2023-06-27 PROCEDURE — 1159F PR MEDICATION LIST DOCUMENTED IN MEDICAL RECORD: ICD-10-PCS | Mod: CPTII,S$GLB,, | Performed by: NURSE PRACTITIONER

## 2023-06-27 PROCEDURE — 99999 PR PBB SHADOW E&M-EST. PATIENT-LVL IV: CPT | Mod: PBBFAC,,, | Performed by: NURSE PRACTITIONER

## 2023-06-27 RX ORDER — DEXTROSE 4 G
TABLET,CHEWABLE ORAL
COMMUNITY
Start: 2022-08-31

## 2023-06-27 RX ORDER — LANCETS 33 GAUGE
EACH MISCELLANEOUS
COMMUNITY
Start: 2022-08-31

## 2023-06-27 RX ORDER — AMIODARONE HYDROCHLORIDE 200 MG/1
100 TABLET ORAL
COMMUNITY
Start: 2023-04-18

## 2023-06-27 RX ORDER — CALCIUM CITRATE/VITAMIN D3 200MG-6.25
TABLET ORAL
COMMUNITY
Start: 2023-06-09

## 2023-06-27 RX ORDER — APIXABAN 5 MG/1
5 TABLET, FILM COATED ORAL 2 TIMES DAILY
COMMUNITY
Start: 2023-03-23

## 2023-06-27 NOTE — PROGRESS NOTES
Clinic Follow Up:  [facility] Follow Up Note    Reason for Follow Up:  The primary encounter diagnosis was Alcoholic cirrhosis of liver without ascites. A diagnosis of Current use of long term anticoagulation was also pertinent to this visit.    PCP: Endy Long       HPI:  This is a 72 y.o. male here for follow up of the above  Pt last seen in 2021.  He has been followed by his PCP since then with AFP every 6 months.  Has not had any recent imaging of the abdomen  Reports that he has been feeling overall well without any decompensating events  Has been started on eliquis for hx of a. Fib, followed by Dr. Gunner Jeter.   He admits to periodic ETOH intake over the last few months   No upper GI bleeding.  No ascites or BLE.  No overt confusion.  Denies any abdominal pain.  No nausea or vomiting.  No change in bowel pattern.  No melena or hematochezia. No weight loss.      Review of Systems   Constitutional:  Negative for chills, fever, malaise/fatigue and weight loss.   Respiratory:  Negative for cough.    Cardiovascular:  Negative for chest pain.   Gastrointestinal:         Per HPI   Musculoskeletal:  Negative for myalgias.   Skin:  Negative for itching and rash.   Neurological:  Negative for headaches.   Psychiatric/Behavioral:  The patient is not nervous/anxious.      Medical History:  Past Medical History:   Diagnosis Date    Cancer     prostate    COVID-19 11/2020    Diabetes mellitus     Hyperlipidemia     Hypertension     Ruptured spleen        Surgical History:   Past Surgical History:   Procedure Laterality Date    CATARACT EXTRACTION, BILATERAL      COLONOSCOPY N/A 3/25/2021    Procedure: COLONOSCOPY;  Surgeon: Awa Chiu MD;  Location: [facility];  Service: Endoscopy;  Laterality: N/A;    ESOPHAGOGASTRODUODENOSCOPY N/A 5/20/2019    Procedure: EGD (ESOPHAGOGASTRODUODENOSCOPY);  Surgeon: Faheem Wilkins MD;  Location: [facility];  Service: Endoscopy;  Laterality: N/A;     PROSTATECTOMY      spleenectomy      TONSILLECTOMY         Family History:   Family History   Problem Relation Age of Onset    Lymphoma Mother     Dementia Father     Prostate cancer Father     Heart disease Father     Prostate cancer Brother        Social History:   Social History     Tobacco Use    Smoking status: Former     Packs/day: 1.50     Years: 10.00     Pack years: 15.00     Types: Cigarettes     Quit date: 1979     Years since quittin.5    Smokeless tobacco: Never   Substance Use Topics    Alcohol use: Not Currently     Alcohol/week: 56.0 standard drinks     Types: 14 Glasses of wine, 42 Cans of beer per week     Comment: quit     Drug use: No       Allergies: Reviewed    Home Medications:  Current Outpatient Medications on File Prior to Visit   Medication Sig Dispense Refill    amiodarone (PACERONE) 200 MG Tab Take 100 mg by mouth.      amLODIPine (NORVASC) 5 MG tablet Take 5 mg by mouth.      atorvastatin (LIPITOR) 40 MG tablet Take 20 mg by mouth.      blood-glucose meter Misc Use to check blood sugar fasting daily.      carvedilol (COREG) 6.25 MG tablet       docusate sodium (COLACE) 100 MG capsule Take 100 mg by mouth.      ELIQUIS 5 mg Tab Take 5 mg by mouth 2 (two) times daily.      fish oil-omega-3 fatty acids 300-1,000 mg capsule Take 2 g by mouth.      irbesartan (AVAPRO) 300 MG tablet TAKE 1 TABLET EVERY DAY. NEED APPOINTMENT.      lancets 33 gauge Misc Use to check BS fasting daily.      metFORMIN (GLUCOPHAGE-XR) 500 MG 24 hr tablet Take 500 mg by mouth.      multivitamin (THERAGRAN) per tablet Take 1 tablet by mouth.      omeprazole (PRILOSEC) 40 MG capsule Take 40 mg by mouth.      TRUE METRIX GLUCOSE TEST STRIP Strp SMARTSIG:Via Meter      aspirin (ECOTRIN) 81 MG EC tablet Take 81 mg by mouth.      cinnamon bark 500 mg capsule Take 1,000 mg by mouth.       No current facility-administered medications on file prior to visit.       Physical Exam:  Vital Signs:  /76  (BP Location: Right arm, Patient Position: Sitting, BP Method: Large (Manual))   Pulse (!) 48   Ht 6' (1.829 m)   Wt 102.8 kg (226 lb 10.1 oz)   BMI 30.74 kg/m²   Body mass index is 30.74 kg/m².  Physical Exam  Vitals reviewed.   Constitutional:       Appearance: He is well-developed.   HENT:      Head: Normocephalic.   Eyes:      General: No scleral icterus.  Cardiovascular:      Rate and Rhythm: Normal rate.   Pulmonary:      Effort: Pulmonary effort is normal.   Abdominal:      General: There is no distension.      Tenderness: There is no abdominal tenderness.   Musculoskeletal:         General: Normal range of motion.      Cervical back: Normal range of motion.   Skin:     General: Skin is warm and dry.   Neurological:      Mental Status: He is alert and oriented to person, place, and time.       Labs: Pertinent labs reviewed.  Computed MELD-Na unavailable. Necessary lab results were not found in the last year.  Computed MELD unavailable. Necessary lab results were not found in the last year.  EV: EGD 2019 without EV  HCC: due for updated US     Assessment:  1. Alcoholic cirrhosis of liver without ascites    2. Current use of long term anticoagulation        Recommendations:  Stable without decompensating events  - discussed importance of regular follow up.  Pt states understanding.   - discussed need for complete ETOH abstinence given the cirrhosis, pt states understanding  - will plan for updated MELD labs and HCC screening now and every 6 months  - due for EGD for EV screening.       Return to Clinic:    6 months with pre-clinic labs and imaging.

## 2023-06-29 ENCOUNTER — HOSPITAL ENCOUNTER (OUTPATIENT)
Dept: RADIOLOGY | Facility: HOSPITAL | Age: 73
Discharge: HOME | End: 2023-06-29
Attending: NURSE PRACTITIONER
Payer: MEDICARE

## 2023-06-29 DIAGNOSIS — K70.30 ALCOHOLIC CIRRHOSIS OF LIVER WITHOUT ASCITES: ICD-10-CM

## 2023-06-29 PROCEDURE — 76705 ECHO EXAM OF ABDOMEN: CPT | Mod: 26,,, | Performed by: RADIOLOGY

## 2023-06-29 PROCEDURE — 76705 US ABDOMEN LIMITED: ICD-10-PCS | Mod: 26,,, | Performed by: RADIOLOGY

## 2023-06-29 PROCEDURE — 76705 ECHO EXAM OF ABDOMEN: CPT | Mod: TC,PO

## 2023-07-26 ENCOUNTER — TELEPHONE (OUTPATIENT)
Dept: PREADMISSION TESTING | Facility: HOSPITAL | Age: 73
End: 2023-07-26
Payer: MEDICARE

## 2023-08-07 DIAGNOSIS — K70.30 ALCOHOLIC CIRRHOSIS OF LIVER WITHOUT ASCITES: Primary | ICD-10-CM

## 2023-08-08 ENCOUNTER — TELEPHONE (OUTPATIENT)
Dept: PREADMISSION TESTING | Facility: HOSPITAL | Age: 73
End: 2023-08-08
Payer: MEDICARE

## 2023-08-08 NOTE — TELEPHONE ENCOUNTER
Uploaded form into media that states pt needs EKG before being cleared for endoscopy procedure.  Notified pt per phone call, pt states will call cardiology to schedule EKG, then call us back to reschedule PAT appt once done

## 2023-08-09 ENCOUNTER — HOSPITAL ENCOUNTER (OUTPATIENT)
Dept: PREADMISSION TESTING | Facility: HOSPITAL | Age: 73
Discharge: HOME | End: 2023-08-09
Attending: INTERNAL MEDICINE
Payer: MEDICARE

## 2023-08-09 DIAGNOSIS — K70.30 ALCOHOLIC CIRRHOSIS OF LIVER WITHOUT ASCITES: ICD-10-CM

## 2023-08-31 ENCOUNTER — TELEPHONE (OUTPATIENT)
Dept: PREADMISSION TESTING | Facility: HOSPITAL | Age: 73
End: 2023-08-31
Payer: MEDICARE

## 2023-08-31 ENCOUNTER — PATIENT MESSAGE (OUTPATIENT)
Dept: PREADMISSION TESTING | Facility: HOSPITAL | Age: 73
End: 2023-08-31
Payer: MEDICARE

## 2023-08-31 DIAGNOSIS — K70.30 ALCOHOLIC CIRRHOSIS OF LIVER WITHOUT ASCITES: Primary | ICD-10-CM

## 2023-09-26 ENCOUNTER — ANESTHESIA EVENT (OUTPATIENT)
Dept: ENDOSCOPY | Facility: HOSPITAL | Age: 73
End: 2023-09-26
Payer: MEDICARE

## 2023-09-26 ENCOUNTER — ANESTHESIA (OUTPATIENT)
Dept: ENDOSCOPY | Facility: HOSPITAL | Age: 73
End: 2023-09-26
Payer: MEDICARE

## 2023-09-26 ENCOUNTER — HOSPITAL ENCOUNTER (OUTPATIENT)
Facility: HOSPITAL | Age: 73
Discharge: HOME | End: 2023-09-26
Attending: INTERNAL MEDICINE | Admitting: INTERNAL MEDICINE
Payer: MEDICARE

## 2023-09-26 DIAGNOSIS — K70.30 ALCOHOLIC CIRRHOSIS OF LIVER: ICD-10-CM

## 2023-09-26 LAB — POCT GLUCOSE: 106 MG/DL (ref 70–110)

## 2023-09-26 PROCEDURE — 25000003 PHARM REV CODE 250: Performed by: INTERNAL MEDICINE

## 2023-09-26 PROCEDURE — 88305 TISSUE EXAM BY PATHOLOGIST: CPT | Performed by: PATHOLOGY

## 2023-09-26 PROCEDURE — 25000003 PHARM REV CODE 250: Performed by: NURSE ANESTHETIST, CERTIFIED REGISTERED

## 2023-09-26 PROCEDURE — 63600175 PHARM REV CODE 636 W HCPCS: Performed by: NURSE ANESTHETIST, CERTIFIED REGISTERED

## 2023-09-26 PROCEDURE — 37000008 HC ANESTHESIA 1ST 15 MINUTES: Performed by: INTERNAL MEDICINE

## 2023-09-26 PROCEDURE — 88305 TISSUE EXAM BY PATHOLOGIST: CPT | Mod: 26,,, | Performed by: PATHOLOGY

## 2023-09-26 PROCEDURE — 88305 TISSUE EXAM BY PATHOLOGIST: ICD-10-PCS | Mod: 26,,, | Performed by: PATHOLOGY

## 2023-09-26 PROCEDURE — 27201012 HC FORCEPS, HOT/COLD, DISP: Performed by: INTERNAL MEDICINE

## 2023-09-26 PROCEDURE — 43239 EGD BIOPSY SINGLE/MULTIPLE: CPT | Mod: ,,, | Performed by: INTERNAL MEDICINE

## 2023-09-26 PROCEDURE — 43239 PR EGD, FLEX, W/BIOPSY, SGL/MULTI: ICD-10-PCS | Mod: ,,, | Performed by: INTERNAL MEDICINE

## 2023-09-26 PROCEDURE — 43239 EGD BIOPSY SINGLE/MULTIPLE: CPT | Performed by: INTERNAL MEDICINE

## 2023-09-26 RX ORDER — OMEPRAZOLE 40 MG/1
40 CAPSULE, DELAYED RELEASE ORAL
Qty: 180 CAPSULE | Refills: 3 | Status: SHIPPED | OUTPATIENT
Start: 2023-09-26 | End: 2024-09-25

## 2023-09-26 RX ORDER — LIDOCAINE HYDROCHLORIDE 10 MG/ML
INJECTION, SOLUTION EPIDURAL; INFILTRATION; INTRACAUDAL; PERINEURAL
Status: DISCONTINUED | OUTPATIENT
Start: 2023-09-26 | End: 2023-09-26

## 2023-09-26 RX ORDER — PROPOFOL 10 MG/ML
VIAL (ML) INTRAVENOUS
Status: DISCONTINUED | OUTPATIENT
Start: 2023-09-26 | End: 2023-09-26

## 2023-09-26 RX ORDER — SODIUM CHLORIDE, SODIUM LACTATE, POTASSIUM CHLORIDE, CALCIUM CHLORIDE 600; 310; 30; 20 MG/100ML; MG/100ML; MG/100ML; MG/100ML
INJECTION, SOLUTION INTRAVENOUS CONTINUOUS PRN
Status: DISCONTINUED | OUTPATIENT
Start: 2023-09-26 | End: 2023-09-26

## 2023-09-26 RX ORDER — DEXTROMETHORPHAN/PSEUDOEPHED 2.5-7.5/.8
DROPS ORAL
Status: COMPLETED | OUTPATIENT
Start: 2023-09-26 | End: 2023-09-26

## 2023-09-26 RX ADMIN — PROPOFOL 30 MG: 10 INJECTION, EMULSION INTRAVENOUS at 10:09

## 2023-09-26 RX ADMIN — PROPOFOL 80 MG: 10 INJECTION, EMULSION INTRAVENOUS at 10:09

## 2023-09-26 RX ADMIN — SODIUM CHLORIDE, SODIUM LACTATE, POTASSIUM CHLORIDE, AND CALCIUM CHLORIDE: 600; 310; 30; 20 INJECTION, SOLUTION INTRAVENOUS at 10:09

## 2023-09-26 RX ADMIN — LIDOCAINE HYDROCHLORIDE 50 MG: 10 SOLUTION INTRAVENOUS at 10:09

## 2023-09-26 NOTE — H&P
PRE PROCEDURE H&P    Patient Name: Terence Marin  MRN: [id number]  : 1950  Date of Procedure:  2023  Referring Physician: Charisse Urias RN  Primary Physician: Endy Long MD  Procedure Physician: Awa Chiu MD       Planned Procedure: EGD  Diagnosis:   Alcohol cirrhosis and Esophageal Varices screen    Chief Complaint: Same as above    HPI: Patient is an 72 y.o. male is here for the above. Follows with ms. Xiomara OROZCO in the liver clinic. Last seen 2023. Admits to alcohol ingestion. Drinks 3-4 glasses of wine per week. Denies variceal bleeding in past. Last EGD . No varices appreciated. Wife at bedside.    MELD 3.0: 7 at 2023 10:11 AM  MELD-Na: 7 at 2023 10:11 AM  Calculated from:  Serum Creatinine: 0.9 mg/dL (Using min of 1 mg/dL) at 2023 10:11 AM  Serum Sodium: 140 mmol/L (Using max of 137 mmol/L) at 2023 10:11 AM  Total Bilirubin: 0.7 mg/dL (Using min of 1 mg/dL) at 2023 10:11 AM  Serum Albumin: 3.9 g/dL (Using max of 3.5 g/dL) at 2023 10:11 AM  INR(ratio): 1.1 at 2023 10:11 AM  Age at listing (hypothetical): 72 years  Sex: Male at 2023 10:11 AM       Last colonoscopy:   Family history: none  Anticoagulation: none    Past Medical History:   Past Medical History:   Diagnosis Date    Anticoagulant long-term use     Cancer     prostate    COVID-19 2020    Diabetes mellitus     Hyperlipidemia     Hypertension     Paroxysmal atrial fibrillation     Ruptured spleen         Past Surgical History:  Past Surgical History:   Procedure Laterality Date    CARDIAC ELECTROPHYSIOLOGY STUDY AND ABLATION      CATARACT EXTRACTION, BILATERAL      COLONOSCOPY N/A 2021    Procedure: COLONOSCOPY;  Surgeon: Awa Chiu MD;  Location: [facility];  Service: Endoscopy;  Laterality: N/A;    ESOPHAGOGASTRODUODENOSCOPY N/A 2019    Procedure: EGD (ESOPHAGOGASTRODUODENOSCOPY);  Surgeon: Faheem Wilkins MD;  Location: [facility];  Service:  Endoscopy;  Laterality: N/A;    PROSTATECTOMY      spleenectomy      TONSILLECTOMY          Home Medications:  Prior to Admission medications    Medication Sig Start Date End Date Taking? Authorizing Provider   amiodarone (PACERONE) 200 MG Tab Take 100 mg by mouth. 23  Yes Provider, Historical   amLODIPine (NORVASC) 5 MG tablet Take 5 mg by mouth. 17  Yes Provider, Historical   atorvastatin (LIPITOR) 40 MG tablet Take 20 mg by mouth. 17  Yes Provider, Historical   carvedilol (COREG) 6.25 MG tablet  17  Yes Provider, Historical   docusate sodium (COLACE) 100 MG capsule Take 100 mg by mouth.   Yes Provider, Historical   fish oil-omega-3 fatty acids 300-1,000 mg capsule Take 2 g by mouth.   Yes Provider, Historical   irbesartan (AVAPRO) 300 MG tablet TAKE 1 TABLET EVERY DAY. NEED APPOINTMENT. 17  Yes Provider, Historical   metFORMIN (GLUCOPHAGE-XR) 500 MG 24 hr tablet Take 500 mg by mouth. 17 Yes Provider, Historical   multivitamin (THERAGRAN) per tablet Take 1 tablet by mouth.   Yes Provider, Historical   omeprazole (PRILOSEC) 40 MG capsule Take 40 mg by mouth. 17  Yes Provider, Historical   blood-glucose meter Misc Use to check blood sugar fasting daily. 22   Provider, Historical   ELIQUIS 5 mg Tab Take 5 mg by mouth 2 (two) times daily. 3/23/23   Provider, Historical   lancets 33 gauge Misc Use to check BS fasting daily. 22   Provider, Historical   TRUE METRIX GLUCOSE TEST STRIP Strp SMARTSIG:Via Meter 23   Provider, Historical        Allergies:  Review of patient's allergies indicates:  No Known Allergies     Social History:   Social History     Socioeconomic History    Marital status:    Tobacco Use    Smoking status: Former     Current packs/day: 0.00     Average packs/day: 1.5 packs/day for 10.0 years (15.0 ttl pk-yrs)     Types: Cigarettes     Start date: 1969     Quit date: 1979     Years since quittin.7    Smokeless tobacco:  Never   Substance and Sexual Activity    Alcohol use: Not Currently     Alcohol/week: 4.0 standard drinks of alcohol     Types: 4 Glasses of wine per week     Comment: drinks wine and non-alcoholic beer    Drug use: No    Sexual activity: Yes     Partners: Female       Family History:  Family History   Problem Relation Age of Onset    Lymphoma Mother     Dementia Father     Prostate cancer Father     Heart disease Father     Prostate cancer Brother        ROS: No acute cardiac events, no acute respiratory complaints.     Physical Exam (all patients):    BP (!) 183/93   Pulse (!) 53   Temp 97.7 °F (36.5 °C)   Resp 20   Ht 6' (1.829 m)   Wt 102.5 kg (226 lb)   SpO2 98%   BMI 30.65 kg/m²   Lungs: Clear to auscultation bilaterally, respirations unlabored  Heart: Regular rate and rhythm, S1 and S2 normal, no obvious murmurs  Abdomen:         Soft, non-tender, bowel sounds normal, no masses, no organomegaly    Lab Results   Component Value Date    WBC 6.51 06/27/2023     (H) 06/27/2023    RDW 13.7 06/27/2023     06/27/2023    INR 1.1 06/27/2023     06/27/2023    HGBA1C 5.7 (H) 05/24/2023    BUN 15 06/27/2023     06/27/2023    K 4.7 06/27/2023     06/27/2023        SEDATION PLAN: per anesthesia      History reviewed, vital signs satisfactory, cardiopulmonary status satisfactory, sedation options, risks and plans have been discussed with the patient  All their questions were answered and the patient agrees to the sedation procedures as planned and the patient is deemed an appropriate candidate for the sedation as planned.    The risks, benefits and alternatives of the procedure were discussed with the patient in detail. This discussion was had in the presence of his wife and endoscopy staff. The risks include, risks of adverse reaction to sedation requiring the use of reversal agents, bleeding requiring blood transfusion, perforation requiring surgical intervention and technical  failure. Other risks include aspiration leading to respiratory distress and respiratory failure resulting in endotracheal intubation and mechanical ventilation including death. If anesthesia is being utilized for this procedure, it is up to the anesthesiologist to determine airway safety including elective endotracheal intubation. Questions were answered, they agree to proceed. There was no language barriers.      Procedure explained to patient, informed consent obtained and placed in chart.    Awa Chiu  9/26/2023  9:55 AM

## 2023-09-26 NOTE — PROVATION PATIENT INSTRUCTIONS
Discharge Summary/Instructions after an Endoscopic Procedure  Patient Name: Terence Marin  Patient MRN: [id number]  Patient YOB: 1950  Tuesday, September 26, 2023 Awa Chiu MD  Dear patient,  As a result of recent federal legislation (The Federal Cures Act), you may   receive lab or pathology results from your procedure in your [facility]   account before your physician is able to contact you. Your physician or   their representative will relay the results to you with their   recommendations at their soonest availability.  Thank you,  RESTRICTIONS:  During your procedure today, you received medications for sedation.  These   medications may affect your judgment, balance and coordination.  Therefore,   for 24 hours, you have the following restrictions:   - DO NOT drive a car, operate machinery, make legal/financial decisions,   sign important papers or drink alcohol.    ACTIVITY:  Today: no heavy lifting, straining or running due to procedural   sedation/anesthesia.  The following day: return to full activity including work.  DIET:  Eat and drink normally unless instructed otherwise.     TREATMENT FOR COMMON SIDE EFFECTS:  - Mild abdominal pain, nausea, belching, bloating or excessive gas:  rest,   eat lightly and use a heating pad.  - Sore Throat: treat with throat lozenges and/or gargle with warm salt   water.  - Because air was used during the procedure, expelling large amounts of air   from your rectum or belching is normal.  - If a bowel prep was taken, you may not have a bowel movement for 1-3 days.    This is normal.  SYMPTOMS TO WATCH FOR AND REPORT TO YOUR PHYSICIAN:  1. Abdominal pain or bloating, other than gas cramps.  2. Chest pain.  3. Back pain.  4. Signs of infection such as: chills or fever occurring within 24 hours   after the procedure.  5. Rectal bleeding, which would show as bright red, maroon, or black stools.   (A tablespoon of blood from the rectum is not serious, especially  if   hemorrhoids are present.)  6. Vomiting.  7. Weakness or dizziness.  GO DIRECTLY TO THE NEAREST EMERGENCY ROOM IF YOU HAVE ANY OF THE FOLLOWING:      Difficulty breathing              Chills and/or fever over 101 F   Persistent vomiting and/or vomiting blood   Severe abdominal pain   Severe chest pain   Black, tarry stools   Bleeding- more than one tablespoon   Any other symptom or condition that you feel may need urgent attention  Your doctor recommends these additional instructions:  If any biopsies were taken, your doctors clinic will contact you in 1 to 2   weeks with any results.  - Discharge patient to home (with escort).   - Resume previous diet.   - Continue present medications.   - Increase Omeprazole to 40mg by mouth twice a day.   - Resume Eliquis (apixaban) at prior dose in 3 days.  Refer to referring   physician for further adjustment of therapy.   - Await pathology results.   - Alcohol cessation.  - Repeat upper endoscopy in 2 years for screening purposes.   - Return to primary care physician.  For questions, problems or results please call your physician Awa Chiu MD at Work:  [phone]  If you have any questions about the above instructions, call the GI   department at [phone] or call the endoscopy unit at [phone]   from 7am until 3 pm.  [facility], EMERGENCY ROOM PHONE NUMBER:   [phone]  IF A COMPLICATION OR EMERGENCY SITUATION ARISES AND YOU ARE UNABLE TO REACH   YOUR PHYSICIAN - GO DIRECTLY TO THE EMERGENCY ROOM.  I have read or have had read to me these discharge instructions for my   procedure and have received a written copy.  I understand these   instructions and will follow-up with my physician if I have any questions.     __________________________________       _____________________________________  Nurse Signature                                          Patient/Designated   Responsible Party Signature  MD Awa Tabares  MD Apple  9/26/2023 10:16:58 AM  This report has been verified and signed electronically.  Dear patient,  As a result of recent federal legislation (The Federal Cures Act), you may   receive lab or pathology results from your procedure in your [facility]   account before your physician is able to contact you. Your physician or   their representative will relay the results to you with their   recommendations at their soonest availability.  Thank you,  PROVATION

## 2023-09-26 NOTE — TRANSFER OF CARE
Anesthesia Transfer of Care Note    Patient: Terence Marin    Procedure(s) Performed: Procedure(s) (LRB):  EGD (ESOPHAGOGASTRODUODENOSCOPY) (N/A)    Patient location: GI    Anesthesia Type: MAC    Transport from OR: Transported from OR on room air with adequate spontaneous ventilation    Post pain: adequate analgesia    Post assessment: no apparent anesthetic complications and tolerated procedure well    Post vital signs: stable    Level of consciousness: responds to stimulation    Nausea/Vomiting: no nausea/vomiting    Complications: none    Transfer of care protocol was followed      Last vitals:   Visit Vitals  BP (!) 183/93   Pulse (!) 53   Temp 36.5 °C (97.7 °F)   Resp 20   Ht 6' (1.829 m)   Wt 102.5 kg (226 lb)   SpO2 98%   BMI 30.65 kg/m²

## 2023-09-26 NOTE — ANESTHESIA POSTPROCEDURE EVALUATION
Anesthesia Post Evaluation    Patient: Terence Marin    Procedure(s) Performed: Procedure(s) (LRB):  EGD (ESOPHAGOGASTRODUODENOSCOPY) (N/A)    Final Anesthesia Type: MAC      Patient location during evaluation: GI PACU  Patient participation: Yes- Able to Participate  Level of consciousness: awake and alert and oriented  Post-procedure vital signs: reviewed and stable  Pain management: adequate  Airway patency: patent  COLBY mitigation strategies: Multimodal analgesia  PONV status at discharge: No PONV  Anesthetic complications: no      Cardiovascular status: hemodynamically stable  Respiratory status: unassisted, spontaneous ventilation and room air  Hydration status: euvolemic  Follow-up not needed.          Vitals Value Taken Time   /72 09/26/23 1034   Temp 36.7 °C (98.1 °F) 09/26/23 1015   Pulse 51 09/26/23 1034   Resp 20 09/26/23 1034   SpO2 97 % 09/26/23 1034         Event Time   Out of Recovery 10:34:00         Pain/Arnie Score: Arnie Score: 10 (9/26/2023 10:34 AM)

## 2023-09-27 VITALS
SYSTOLIC BLOOD PRESSURE: 158 MMHG | WEIGHT: 226 LBS | TEMPERATURE: 98 F | DIASTOLIC BLOOD PRESSURE: 72 MMHG | RESPIRATION RATE: 20 BRPM | OXYGEN SATURATION: 97 % | HEART RATE: 51 BPM | BODY MASS INDEX: 30.61 KG/M2 | HEIGHT: 72 IN

## 2023-09-28 LAB
FINAL PATHOLOGIC DIAGNOSIS: NORMAL
GROSS: NORMAL
Lab: NORMAL

## 2023-09-29 NOTE — PROGRESS NOTES
The biopsies of your stomach show no signs of infection. Please abstain from alcohol like we discussed. Please follow up in the liver clinic with Ms. Xiomara OROZCO as previously recommended.

## 2024-03-11 ENCOUNTER — OFFICE VISIT (OUTPATIENT)
Dept: HEPATOLOGY | Facility: CLINIC | Age: 74
End: 2024-03-11
Payer: MEDICARE

## 2024-03-11 ENCOUNTER — TELEPHONE (OUTPATIENT)
Dept: HEPATOLOGY | Facility: CLINIC | Age: 74
End: 2024-03-11

## 2024-03-11 ENCOUNTER — LAB VISIT (OUTPATIENT)
Dept: LAB | Facility: HOSPITAL | Age: 74
End: 2024-03-11
Attending: NURSE PRACTITIONER
Payer: MEDICARE

## 2024-03-11 VITALS
DIASTOLIC BLOOD PRESSURE: 64 MMHG | BODY MASS INDEX: 31.8 KG/M2 | WEIGHT: 234.81 LBS | SYSTOLIC BLOOD PRESSURE: 144 MMHG | HEART RATE: 54 BPM | HEIGHT: 72 IN

## 2024-03-11 DIAGNOSIS — K70.30 ALCOHOLIC CIRRHOSIS OF LIVER WITHOUT ASCITES: Primary | ICD-10-CM

## 2024-03-11 DIAGNOSIS — K70.30 ALCOHOLIC CIRRHOSIS OF LIVER WITHOUT ASCITES: ICD-10-CM

## 2024-03-11 DIAGNOSIS — Z79.01 CURRENT USE OF LONG TERM ANTICOAGULATION: ICD-10-CM

## 2024-03-11 DIAGNOSIS — I48.91 ATRIAL FIBRILLATION, CONTROLLED: ICD-10-CM

## 2024-03-11 LAB
AFP SERPL-MCNC: <2 NG/ML (ref 0–8.4)
ALBUMIN SERPL BCP-MCNC: 3.8 G/DL (ref 3.5–5.2)
ALP SERPL-CCNC: 73 U/L (ref 55–135)
ALT SERPL W/O P-5'-P-CCNC: 29 U/L (ref 10–44)
ANION GAP SERPL CALC-SCNC: 6 MMOL/L (ref 8–16)
AST SERPL-CCNC: 20 U/L (ref 10–40)
BASOPHILS # BLD AUTO: 0.05 K/UL (ref 0–0.2)
BASOPHILS NFR BLD: 0.9 % (ref 0–1.9)
BILIRUB SERPL-MCNC: 0.6 MG/DL (ref 0.1–1)
BUN SERPL-MCNC: 12 MG/DL (ref 8–23)
CALCIUM SERPL-MCNC: 9.6 MG/DL (ref 8.7–10.5)
CHLORIDE SERPL-SCNC: 106 MMOL/L (ref 95–110)
CO2 SERPL-SCNC: 27 MMOL/L (ref 23–29)
CREAT SERPL-MCNC: 0.9 MG/DL (ref 0.5–1.4)
DIFFERENTIAL METHOD BLD: ABNORMAL
EOSINOPHIL # BLD AUTO: 0.1 K/UL (ref 0–0.5)
EOSINOPHIL NFR BLD: 1.6 % (ref 0–8)
ERYTHROCYTE [DISTWIDTH] IN BLOOD BY AUTOMATED COUNT: 13.3 % (ref 11.5–14.5)
EST. GFR  (NO RACE VARIABLE): >60 ML/MIN/1.73 M^2
GLUCOSE SERPL-MCNC: 116 MG/DL (ref 70–110)
HCT VFR BLD AUTO: 41.9 % (ref 40–54)
HGB BLD-MCNC: 14.1 G/DL (ref 14–18)
IMM GRANULOCYTES # BLD AUTO: 0.03 K/UL (ref 0–0.04)
IMM GRANULOCYTES NFR BLD AUTO: 0.5 % (ref 0–0.5)
LYMPHOCYTES # BLD AUTO: 1.6 K/UL (ref 1–4.8)
LYMPHOCYTES NFR BLD: 28.6 % (ref 18–48)
MCH RBC QN AUTO: 33.3 PG (ref 27–31)
MCHC RBC AUTO-ENTMCNC: 33.7 G/DL (ref 32–36)
MCV RBC AUTO: 99 FL (ref 82–98)
MONOCYTES # BLD AUTO: 0.5 K/UL (ref 0.3–1)
MONOCYTES NFR BLD: 8.6 % (ref 4–15)
NEUTROPHILS # BLD AUTO: 3.4 K/UL (ref 1.8–7.7)
NEUTROPHILS NFR BLD: 59.8 % (ref 38–73)
NRBC BLD-RTO: 0 /100 WBC
PLATELET # BLD AUTO: 191 K/UL (ref 150–450)
PMV BLD AUTO: 11.7 FL (ref 9.2–12.9)
POTASSIUM SERPL-SCNC: 4.7 MMOL/L (ref 3.5–5.1)
PROT SERPL-MCNC: 6.8 G/DL (ref 6–8.4)
RBC # BLD AUTO: 4.23 M/UL (ref 4.6–6.2)
SODIUM SERPL-SCNC: 139 MMOL/L (ref 136–145)
WBC # BLD AUTO: 5.67 K/UL (ref 3.9–12.7)

## 2024-03-11 PROCEDURE — 85025 COMPLETE CBC W/AUTO DIFF WBC: CPT | Performed by: NURSE PRACTITIONER

## 2024-03-11 PROCEDURE — 3288F FALL RISK ASSESSMENT DOCD: CPT | Mod: CPTII,S$GLB,, | Performed by: NURSE PRACTITIONER

## 2024-03-11 PROCEDURE — 1101F PT FALLS ASSESS-DOCD LE1/YR: CPT | Mod: CPTII,S$GLB,, | Performed by: NURSE PRACTITIONER

## 2024-03-11 PROCEDURE — 3077F SYST BP >= 140 MM HG: CPT | Mod: CPTII,S$GLB,, | Performed by: NURSE PRACTITIONER

## 2024-03-11 PROCEDURE — 3078F DIAST BP <80 MM HG: CPT | Mod: CPTII,S$GLB,, | Performed by: NURSE PRACTITIONER

## 2024-03-11 PROCEDURE — 1159F MED LIST DOCD IN RCRD: CPT | Mod: CPTII,S$GLB,, | Performed by: NURSE PRACTITIONER

## 2024-03-11 PROCEDURE — 99214 OFFICE O/P EST MOD 30 MIN: CPT | Mod: S$GLB,,, | Performed by: NURSE PRACTITIONER

## 2024-03-11 PROCEDURE — 80053 COMPREHEN METABOLIC PANEL: CPT | Performed by: NURSE PRACTITIONER

## 2024-03-11 PROCEDURE — 82105 ALPHA-FETOPROTEIN SERUM: CPT | Performed by: NURSE PRACTITIONER

## 2024-03-11 PROCEDURE — 36415 COLL VENOUS BLD VENIPUNCTURE: CPT | Performed by: NURSE PRACTITIONER

## 2024-03-11 PROCEDURE — 99999 PR PBB SHADOW E&M-EST. PATIENT-LVL IV: CPT | Mod: PBBFAC,,, | Performed by: NURSE PRACTITIONER

## 2024-03-11 PROCEDURE — 1126F AMNT PAIN NOTED NONE PRSNT: CPT | Mod: CPTII,S$GLB,, | Performed by: NURSE PRACTITIONER

## 2024-03-11 PROCEDURE — 3008F BODY MASS INDEX DOCD: CPT | Mod: CPTII,S$GLB,, | Performed by: NURSE PRACTITIONER

## 2024-03-11 NOTE — PROGRESS NOTES
Clinic Follow Up:  [facility] Follow Up Note    Reason for Follow Up:  The primary encounter diagnosis was Alcoholic cirrhosis of liver without ascites. Diagnoses of Current use of long term anticoagulation and Atrial fibrillation, controlled were also pertinent to this visit.    PCP: Endy Long       HPI:  This is a 73 y.o. male here for follow up of the above  Pt states that he has been feeling overall well without new complaints.   Since his last visit, he was diagnosed with A fib and started on amiodarone.   Denies any abdominal pain.  No nausea or vomiting.  No change in bowel pattern.  No melena or hematochezia. No weight loss.  No upper GI bleeding.  No ascites or BLE.  No overt confusion.  Recent EGD without EV  Due for labs and HCC screening.       Review of Systems   Constitutional:  Negative for chills, fever, malaise/fatigue and weight loss.   Respiratory:  Negative for cough.    Cardiovascular:  Negative for chest pain.   Gastrointestinal:         Per HPI   Musculoskeletal:  Negative for myalgias.   Skin:  Negative for itching and rash.   Neurological:  Negative for headaches.   Psychiatric/Behavioral:  The patient is not nervous/anxious.        Medical History:  Past Medical History:   Diagnosis Date    Anticoagulant long-term use     Cancer     prostate    COVID-19 11/2020    Diabetes mellitus     Hyperlipidemia     Hypertension     Paroxysmal atrial fibrillation     Ruptured spleen        Surgical History:   Past Surgical History:   Procedure Laterality Date    CARDIAC ELECTROPHYSIOLOGY STUDY AND ABLATION      CATARACT EXTRACTION, BILATERAL      COLONOSCOPY N/A 03/25/2021    Procedure: COLONOSCOPY;  Surgeon: Awa Chiu MD;  Location: [facility];  Service: Endoscopy;  Laterality: N/A;    ESOPHAGOGASTRODUODENOSCOPY N/A 05/20/2019    Procedure: EGD (ESOPHAGOGASTRODUODENOSCOPY);  Surgeon: Faheem Wilkins MD;  Location: [facility];  Service: Endoscopy;  Laterality: N/A;     ESOPHAGOGASTRODUODENOSCOPY N/A 2023    Procedure: EGD (ESOPHAGOGASTRODUODENOSCOPY);  Surgeon: Awa Chiu MD;  Location: [facility];  Service: Endoscopy;  Laterality: N/A;    PROSTATECTOMY      spleenectomy      TONSILLECTOMY         Family History:   Family History   Problem Relation Age of Onset    Lymphoma Mother     Dementia Father     Prostate cancer Father     Heart disease Father     Prostate cancer Brother        Social History:   Social History     Tobacco Use    Smoking status: Former     Current packs/day: 0.00     Average packs/day: 1.5 packs/day for 10.0 years (15.0 ttl pk-yrs)     Types: Cigarettes     Start date: 1969     Quit date: 1979     Years since quittin.2    Smokeless tobacco: Never   Substance Use Topics    Alcohol use: Not Currently     Alcohol/week: 4.0 standard drinks of alcohol     Types: 4 Glasses of wine per week     Comment: drinks wine and non-alcoholic beer    Drug use: No       Allergies: Reviewed    Home Medications:  Current Outpatient Medications on File Prior to Visit   Medication Sig Dispense Refill    amLODIPine (NORVASC) 5 MG tablet Take 5 mg by mouth.      atorvastatin (LIPITOR) 40 MG tablet Take 20 mg by mouth.      blood-glucose meter Misc Use to check blood sugar fasting daily.      carvedilol (COREG) 6.25 MG tablet       docusate sodium (COLACE) 100 MG capsule Take 100 mg by mouth.      ELIQUIS 5 mg Tab Take 5 mg by mouth 2 (two) times daily.      fish oil-omega-3 fatty acids 300-1,000 mg capsule Take 2 g by mouth.      irbesartan (AVAPRO) 300 MG tablet TAKE 1 TABLET EVERY DAY. NEED APPOINTMENT.      lancets 33 gauge Misc Use to check BS fasting daily.      metFORMIN (GLUCOPHAGE-XR) 500 MG 24 hr tablet Take 500 mg by mouth.      multivitamin (THERAGRAN) per tablet Take 1 tablet by mouth.      omeprazole (PRILOSEC) 40 MG capsule Take 1 capsule (40 mg total) by mouth 2 (two) times daily before meals. 180 capsule 3    TRUE METRIX GLUCOSE TEST STRIP  Strp SMARTSIG:Via Meter      amiodarone (PACERONE) 200 MG Tab Take 100 mg by mouth.       No current facility-administered medications on file prior to visit.       Physical Exam:  Vital Signs:  BP (!) 144/64 (BP Location: Left arm, Patient Position: Sitting, BP Method: Medium (Manual))   Pulse (!) 54   Ht 6' (1.829 m)   Wt 106.5 kg (234 lb 12.6 oz)   BMI 31.84 kg/m²   Body mass index is 31.84 kg/m².  Physical Exam  Vitals reviewed.   Constitutional:       Appearance: He is well-developed.   HENT:      Head: Normocephalic.   Eyes:      General: No scleral icterus.  Cardiovascular:      Rate and Rhythm: Normal rate.   Pulmonary:      Effort: Pulmonary effort is normal.   Abdominal:      General: There is no distension.      Tenderness: There is no abdominal tenderness.   Musculoskeletal:         General: Normal range of motion.      Cervical back: Normal range of motion.   Skin:     General: Skin is warm and dry.   Neurological:      Mental Status: He is alert and oriented to person, place, and time.         Labs: Pertinent labs reviewed.      Assessment:  1. Alcoholic cirrhosis of liver without ascites    2. Current use of long term anticoagulation    3. Atrial fibrillation, controlled        Recommendations:  Stable without new complaints.   Will get updated MELD labs and HCC screening now and every 6 months   Repeat EGD every 2 years for EV screening.   Continue POC per cardiology for A fib    Return to Clinic:  6 months with pre-clinic labs and imaging.

## 2024-03-11 NOTE — TELEPHONE ENCOUNTER
----- Message from Amanda Love MA sent at 3/11/2024 10:08 AM CDT -----  Please place Protime INR lab for patient to redraw.

## 2024-03-19 ENCOUNTER — HOSPITAL ENCOUNTER (OUTPATIENT)
Dept: RADIOLOGY | Facility: HOSPITAL | Age: 74
Discharge: HOME | End: 2024-03-19
Attending: NURSE PRACTITIONER
Payer: MEDICARE

## 2024-03-19 DIAGNOSIS — K70.30 ALCOHOLIC CIRRHOSIS OF LIVER WITHOUT ASCITES: ICD-10-CM

## 2024-03-19 PROCEDURE — 76705 ECHO EXAM OF ABDOMEN: CPT | Mod: 26,,, | Performed by: RADIOLOGY

## 2024-03-19 PROCEDURE — 76705 ECHO EXAM OF ABDOMEN: CPT | Mod: TC,PO

## 2024-05-02 NOTE — ANESTHESIA PREPROCEDURE EVALUATION
09/26/2023  Terence Marin is a 72 y.o., male.      Pre-op Assessment    I have reviewed the Patient Summary Reports.    I have reviewed the NPO Status.   I have reviewed the Medications.     Review of Systems  Anesthesia Hx:  No problems with previous Anesthesia    Social:  Non-Smoker, Alcohol Use    Hematology/Oncology:  Hematology Normal       -- Cancer in past history:  Other (see Oncology comments) surgery  Oncology Comments: Prostate      EENT/Dental:EENT/Dental Normal   Cardiovascular:   Hypertension, well controlled Dysrhythmias atrial fibrillation hyperlipidemia    Pulmonary:  Pulmonary Normal    Renal/:  Renal/ Normal     Hepatic/GI:   GERD, well controlled Liver Disease,    Musculoskeletal:  Musculoskeletal Normal    Neurological:  Neurology Normal    Endocrine:   Diabetes, well controlled    Dermatological:  Skin Normal    Psych:  Psychiatric Normal           Physical Exam  General: Well nourished, Cooperative, Alert and Oriented    Airway:  Mallampati: II   Mouth Opening: Normal  TM Distance: Normal  Tongue: Normal  Neck ROM: Normal ROM    Dental:  Intact, Periodontal disease        Anesthesia Plan  Type of Anesthesia, risks & benefits discussed:    Anesthesia Type: MAC  Intra-op Monitoring Plan: Standard ASA Monitors  Post Op Pain Control Plan: IV/PO Opioids PRN  Informed Consent: Informed consent signed with the Patient and all parties understand the risks and agree with anesthesia plan.  All questions answered.   ASA Score: 3  Day of Surgery Review of History & Physical: H&P Update referred to the surgeon/provider.    Ready For Surgery From Anesthesia Perspective.     .       Patient informed okay per Micha

## 2024-07-31 ENCOUNTER — PATIENT MESSAGE (OUTPATIENT)
Dept: RESEARCH | Facility: HOSPITAL | Age: 74
End: 2024-07-31
Payer: MEDICARE

## 2024-09-16 ENCOUNTER — HOSPITAL ENCOUNTER (OUTPATIENT)
Dept: RADIOLOGY | Facility: HOSPITAL | Age: 74
Discharge: HOME | End: 2024-09-16
Attending: NURSE PRACTITIONER
Payer: MEDICARE

## 2024-09-16 DIAGNOSIS — K70.30 ALCOHOLIC CIRRHOSIS OF LIVER WITHOUT ASCITES: ICD-10-CM

## 2024-09-16 PROCEDURE — 76705 ECHO EXAM OF ABDOMEN: CPT | Mod: TC,PO

## 2024-09-16 PROCEDURE — 76705 ECHO EXAM OF ABDOMEN: CPT | Mod: 26,,, | Performed by: STUDENT IN AN ORGANIZED HEALTH CARE EDUCATION/TRAINING PROGRAM

## 2024-10-02 ENCOUNTER — OFFICE VISIT (OUTPATIENT)
Dept: HEPATOLOGY | Facility: CLINIC | Age: 74
End: 2024-10-02
Attending: NURSE PRACTITIONER
Payer: MEDICARE

## 2024-10-02 VITALS
HEIGHT: 72 IN | WEIGHT: 221.13 LBS | BODY MASS INDEX: 29.95 KG/M2 | DIASTOLIC BLOOD PRESSURE: 79 MMHG | SYSTOLIC BLOOD PRESSURE: 165 MMHG | HEART RATE: 58 BPM

## 2024-10-02 DIAGNOSIS — K70.30 ALCOHOLIC CIRRHOSIS OF LIVER WITHOUT ASCITES: Primary | ICD-10-CM

## 2024-10-02 PROCEDURE — 3008F BODY MASS INDEX DOCD: CPT | Mod: CPTII,S$GLB,, | Performed by: INTERNAL MEDICINE

## 2024-10-02 PROCEDURE — 1160F RVW MEDS BY RX/DR IN RCRD: CPT | Mod: CPTII,S$GLB,, | Performed by: INTERNAL MEDICINE

## 2024-10-02 PROCEDURE — 3077F SYST BP >= 140 MM HG: CPT | Mod: CPTII,S$GLB,, | Performed by: INTERNAL MEDICINE

## 2024-10-02 PROCEDURE — 99213 OFFICE O/P EST LOW 20 MIN: CPT | Mod: S$GLB,,, | Performed by: INTERNAL MEDICINE

## 2024-10-02 PROCEDURE — 1159F MED LIST DOCD IN RCRD: CPT | Mod: CPTII,S$GLB,, | Performed by: INTERNAL MEDICINE

## 2024-10-02 PROCEDURE — 3061F NEG MICROALBUMINURIA REV: CPT | Mod: CPTII,S$GLB,, | Performed by: INTERNAL MEDICINE

## 2024-10-02 PROCEDURE — 1126F AMNT PAIN NOTED NONE PRSNT: CPT | Mod: CPTII,S$GLB,, | Performed by: INTERNAL MEDICINE

## 2024-10-02 PROCEDURE — 3066F NEPHROPATHY DOC TX: CPT | Mod: CPTII,S$GLB,, | Performed by: INTERNAL MEDICINE

## 2024-10-02 PROCEDURE — 3078F DIAST BP <80 MM HG: CPT | Mod: CPTII,S$GLB,, | Performed by: INTERNAL MEDICINE

## 2024-10-02 PROCEDURE — 99999 PR PBB SHADOW E&M-EST. PATIENT-LVL IV: CPT | Mod: PBBFAC,,, | Performed by: INTERNAL MEDICINE

## 2024-10-02 PROCEDURE — 1101F PT FALLS ASSESS-DOCD LE1/YR: CPT | Mod: CPTII,S$GLB,, | Performed by: INTERNAL MEDICINE

## 2024-10-02 PROCEDURE — 3044F HG A1C LEVEL LT 7.0%: CPT | Mod: CPTII,S$GLB,, | Performed by: INTERNAL MEDICINE

## 2024-10-02 PROCEDURE — 3288F FALL RISK ASSESSMENT DOCD: CPT | Mod: CPTII,S$GLB,, | Performed by: INTERNAL MEDICINE

## 2024-10-02 NOTE — PROGRESS NOTES
Subjective:     Terence Marin is here for follow up of cirrhosis    HPI  Since Terence Marin's last visit there have been no significant issues.  Overall feels well.  Once he was diagnosed with cirrhosis he reports he has stopped drinking and had been abstinent completely for 3 years.  He reports over the last 6 months he has started to drink a little bit more but reports it is only occasional red wine.    No evidence of liver decompensation: no ascites, confusion or GI bleeding.      Review of Systems    Objective:     Physical Exam  Vitals reviewed.   Constitutional:       General: He is not in acute distress.     Appearance: He is well-developed.   HENT:      Head: Normocephalic and atraumatic.      Mouth/Throat:      Pharynx: No oropharyngeal exudate.   Eyes:      General: No scleral icterus.        Right eye: No discharge.         Left eye: No discharge.      Conjunctiva/sclera: Conjunctivae normal.      Pupils: Pupils are equal, round, and reactive to light.   Pulmonary:      Effort: Pulmonary effort is normal. No respiratory distress.      Breath sounds: Normal breath sounds. No wheezing.   Abdominal:      General: There is no distension.      Palpations: Abdomen is soft.      Tenderness: There is no abdominal tenderness.   Neurological:      Mental Status: He is alert and oriented to person, place, and time.   Psychiatric:         Behavior: Behavior normal.         MELD 3.0: 6 at 9/16/2024  9:40 AM  MELD-Na: 6 at 9/16/2024  9:40 AM  Calculated from:  Serum Creatinine: 0.9 mg/dL (Using min of 1 mg/dL) at 9/16/2024  9:40 AM  Serum Sodium: 142 mmol/L (Using max of 137 mmol/L) at 9/16/2024  9:40 AM  Total Bilirubin: 0.6 mg/dL (Using min of 1 mg/dL) at 9/16/2024  9:40 AM  Serum Albumin: 3.8 g/dL (Using max of 3.5 g/dL) at 9/16/2024  9:40 AM  INR(ratio): 1 at 9/16/2024  9:40 AM  Age at listing (hypothetical): 73 years  Sex: Male at 9/16/2024  9:40 AM      WBC   Date Value Ref Range Status   09/16/2024 6.03  3.90 - 12.70 K/uL Final     Hemoglobin   Date Value Ref Range Status   09/16/2024 13.7 (L) 14.0 - 18.0 g/dL Final     Hematocrit   Date Value Ref Range Status   09/16/2024 41.7 40.0 - 54.0 % Final     Platelets   Date Value Ref Range Status   09/16/2024 201 150 - 450 K/uL Final     BUN   Date Value Ref Range Status   09/16/2024 13 8 - 23 mg/dL Final     Creatinine   Date Value Ref Range Status   09/16/2024 0.9 0.5 - 1.4 mg/dL Final     Glucose   Date Value Ref Range Status   09/16/2024 125 (H) 70 - 110 mg/dL Final     Calcium   Date Value Ref Range Status   09/16/2024 9.2 8.7 - 10.5 mg/dL Final     Sodium   Date Value Ref Range Status   09/16/2024 142 136 - 145 mmol/L Final     Potassium   Date Value Ref Range Status   09/16/2024 4.3 3.5 - 5.1 mmol/L Final     Chloride   Date Value Ref Range Status   09/16/2024 106 95 - 110 mmol/L Final     Magnesium   Date Value Ref Range Status   08/01/2008 2.1 1.6 - 2.6 mg/dl Final     AST   Date Value Ref Range Status   09/16/2024 20 10 - 40 U/L Final     ALT   Date Value Ref Range Status   09/16/2024 27 10 - 44 U/L Final     Alkaline Phosphatase   Date Value Ref Range Status   09/16/2024 58 55 - 135 U/L Final     Total Bilirubin   Date Value Ref Range Status   09/16/2024 0.6 0.1 - 1.0 mg/dL Final     Comment:     For infants and newborns, interpretation of results should be based  on gestational age, weight and in agreement with clinical  observations.    Premature Infant recommended reference ranges:  Up to 24 hours.............<8.0 mg/dL  Up to 48 hours............<12.0 mg/dL  3-5 days..................<15.0 mg/dL  6-29 days.................<15.0 mg/dL       Albumin   Date Value Ref Range Status   09/16/2024 3.8 3.5 - 5.2 g/dL Final     INR   Date Value Ref Range Status   09/16/2024 1.0 0.8 - 1.2 Final     Comment:     Coumadin Therapy:  2.0 - 3.0 for INR for all indicators except mechanical heart valves  and antiphospholipid syndromes which should use 2.5 - 3.5.            Assessment/Plan:     1. Alcoholic cirrhosis of liver without ascites      Terence Marin is a 73 y.o. male withCirrhosis      Cirrhosis- low MELD, well compensated; difficult to assess how diagnosis was made although in a note it is as patient had a FibroScan showing cirrhosis.  There is no other suggestion of cirrhosis on imaging, labs or endoscopies.  Would like to re-evaluate.  Will try to re-evaluate FibroScan but ultimately he may need a biopsy for definitive diagnosis otherwise would need to keep managing as cirrhosis.  -despite whether or not diagnosis is correct or not stressed to patient the importance of abstaining from alcohol  -Continue to monitor MELD labs  -Continue with HCC surveillance  -Continue variceal screening-2026  -     US Abdomen Limited; Future; Expected date: 10/02/2024  -     Comprehensive Metabolic Panel; Future; Expected date: 10/02/2024  -     CBC Auto Differential; Future; Expected date: 10/02/2024  -     AFP Tumor Marker; Future; Expected date: 10/02/2024  -     Protime-INR; Future; Expected date: 10/02/2024  -     FibroScan (Vibration Controlled Transient Elastography); Future      RTC in 6 months with preclinic labs and imaging    Mari Stauffer MD

## 2024-10-14 ENCOUNTER — PROCEDURE VISIT (OUTPATIENT)
Dept: HEPATOLOGY | Facility: CLINIC | Age: 74
End: 2024-10-14
Payer: MEDICARE

## 2024-10-14 VITALS — WEIGHT: 220.44 LBS | HEIGHT: 72 IN | BODY MASS INDEX: 29.86 KG/M2

## 2024-10-14 DIAGNOSIS — K70.30 ALCOHOLIC CIRRHOSIS OF LIVER WITHOUT ASCITES: ICD-10-CM

## 2024-10-14 PROCEDURE — 91200 LIVER ELASTOGRAPHY: CPT | Mod: S$GLB,,, | Performed by: INTERNAL MEDICINE

## 2024-10-14 NOTE — PROCEDURES
FibroScan (Vibration Controlled Transient Elastography)    Date/Time: 10/14/2024 2:00 PM    Performed by: Jeri Tillman RN  Authorized by: Mari Stauffer MD    Diagnosis:  Alcohol    Probe:  M    Universal Protocol: Patient's identity, procedure and site were verified, confirmatory pause was performed.  Discussed procedure including risks and potential complications.  Questions answered.  Patient verbalizes understanding and wishes to proceed with VCTE.     Procedure: After providing explanations of the procedure, patient was placed in the supine position with right arm in maximum abduction to allow optimal exposure of right lateral abdomen.  Patient was briefly assessed, Testing was performed in the mid-axillary location, 50Hz Shear Wave pulses were applied and the resulting Shear Wave and Propagation Speed detected with a 3.5 MHz ultrasonic signal, using the FibroScan probe, Skin to liver capsule distance and liver parenchyma were accessed during the entire examination with the FibroScan probe, Patient was instructed to breathe normally and to abstain from sudden movements during the procedure, allowing for random measurements of liver stiffness. At least 10 Shear Waves were produced, Individual measurements of each Shear Wave were calculated.  Patient tolerated the procedure well with no complications.  Meets discharge criteria as was dismissed.  Rates pain 0 out of 10.  Patient will follow up with ordering provider to review results.    Findings  Median liver stiffness score:  7.9  CAP Reading: dB/m:  271    IQR/med %:  18  Interpretation  Fibrosis interpretation is based on medial liver stiffness - Kilopascal (kPa).    Fibrosis Stage:  F 0-1  Steatosis interpretation is based on controlled attenuation parameter - (dB/m).    Steatosis Grade:  S2  Comments/Plan:  Moderate steatosis and no fibrosis

## 2024-10-18 ENCOUNTER — TELEPHONE (OUTPATIENT)
Dept: HEPATOLOGY | Facility: CLINIC | Age: 74
End: 2024-10-18
Payer: MEDICARE

## 2024-10-18 NOTE — TELEPHONE ENCOUNTER
----- Message from Lani sent at 10/18/2024 11:46 AM CDT -----  Type:  Test Results    Who Called: pt  Name of Test (Lab/Mammo/Etc): Fibroscan   Date of Test: 10/14  Ordering Provider: Dr Stauffer  Where the test was performed: [facility]   Would the patient rather a call back or a response via [facility]? call  Best Call Back Number: [phone]  Additional Information:  .    Thank you

## 2025-04-02 ENCOUNTER — HOSPITAL ENCOUNTER (OUTPATIENT)
Dept: RADIOLOGY | Facility: HOSPITAL | Age: 75
Discharge: HOME | End: 2025-04-02
Attending: INTERNAL MEDICINE
Payer: MEDICARE

## 2025-04-02 ENCOUNTER — RESULTS FOLLOW-UP (OUTPATIENT)
Dept: HEPATOLOGY | Facility: CLINIC | Age: 75
End: 2025-04-02

## 2025-04-02 DIAGNOSIS — K70.30 ALCOHOLIC CIRRHOSIS OF LIVER WITHOUT ASCITES: ICD-10-CM

## 2025-04-02 PROCEDURE — 76705 ECHO EXAM OF ABDOMEN: CPT | Mod: 26,,, | Performed by: RADIOLOGY

## 2025-04-02 PROCEDURE — 76705 ECHO EXAM OF ABDOMEN: CPT | Mod: TC,PO

## 2025-06-03 ENCOUNTER — RESULTS FOLLOW-UP (OUTPATIENT)
Dept: HEPATOLOGY | Facility: CLINIC | Age: 75
End: 2025-06-03

## 2025-07-15 ENCOUNTER — TELEPHONE (OUTPATIENT)
Dept: HEPATOLOGY | Facility: CLINIC | Age: 75
End: 2025-07-15
Payer: MEDICARE

## 2025-07-15 NOTE — TELEPHONE ENCOUNTER
Called patient to reschedule his appointment due to bookout.  Patient had labs/us in April and both were stable and reviewed by Dr. Sanchez.  Changed appointment to October with labs/us prior to appointment.  Patient is aware and agreed with date, time and location of appointments.